# Patient Record
Sex: FEMALE | Race: WHITE | Employment: FULL TIME | URBAN - NONMETROPOLITAN AREA
[De-identification: names, ages, dates, MRNs, and addresses within clinical notes are randomized per-mention and may not be internally consistent; named-entity substitution may affect disease eponyms.]

---

## 2017-03-06 ENCOUNTER — OFFICE VISIT (OUTPATIENT)
Age: 52
End: 2017-03-06

## 2017-03-06 VITALS
BODY MASS INDEX: 25.76 KG/M2 | SYSTOLIC BLOOD PRESSURE: 122 MMHG | WEIGHT: 170 LBS | TEMPERATURE: 98.7 F | RESPIRATION RATE: 16 BRPM | OXYGEN SATURATION: 97 % | DIASTOLIC BLOOD PRESSURE: 74 MMHG | HEART RATE: 80 BPM | HEIGHT: 68 IN

## 2017-03-06 DIAGNOSIS — E03.9 HYPOTHYROIDISM, UNSPECIFIED TYPE: ICD-10-CM

## 2017-03-06 DIAGNOSIS — Z85.3 HISTORY OF INVASIVE BREAST CANCER: Primary | ICD-10-CM

## 2017-03-06 PROCEDURE — 99214 OFFICE O/P EST MOD 30 MIN: CPT | Performed by: SURGERY

## 2017-03-06 RX ORDER — LISINOPRIL 10 MG/1
10 TABLET ORAL DAILY
COMMUNITY

## 2017-03-06 ASSESSMENT — ENCOUNTER SYMPTOMS
COLOR CHANGE: 0
BLOOD IN STOOL: 0
SHORTNESS OF BREATH: 0
VOMITING: 0
NAUSEA: 0
TROUBLE SWALLOWING: 0
COUGH: 0
WHEEZING: 0
ABDOMINAL PAIN: 0
VOICE CHANGE: 0
SORE THROAT: 0

## 2018-12-12 ENCOUNTER — HOSPITAL ENCOUNTER (OUTPATIENT)
Dept: MRI IMAGING | Age: 53
Discharge: HOME | End: 2018-12-12
Payer: COMMERCIAL

## 2018-12-12 DIAGNOSIS — C50.411 MALIGNANT NEOPLASM OF UPPER-OUTER QUADRANT OF RIGHT FEMALE BREAST, UNSPECIFIED ESTROGEN RECEPTOR STATUS (HCC): ICD-10-CM

## 2018-12-12 LAB — POC CREATININE WHOLE BLOOD: 0.7 MG/DL (ref 0.5–1.2)

## 2018-12-12 PROCEDURE — A9581 GADOXETATE DISODIUM INJ: HCPCS | Performed by: INTERNAL MEDICINE

## 2018-12-12 PROCEDURE — 82565 ASSAY OF CREATININE: CPT

## 2018-12-12 PROCEDURE — 6360000004 HC RX CONTRAST MEDICATION: Performed by: INTERNAL MEDICINE

## 2018-12-12 PROCEDURE — 74183 MRI ABD W/O CNTR FLWD CNTR: CPT

## 2018-12-12 RX ADMIN — GADOXETATE DISODIUM 10 ML: 181.43 INJECTION, SOLUTION INTRAVENOUS at 12:52

## 2019-03-25 ENCOUNTER — NURSE ONLY (OUTPATIENT)
Dept: LAB | Age: 54
End: 2019-03-25

## 2019-03-25 LAB
ALBUMIN SERPL-MCNC: 4 G/DL (ref 3.5–5.1)
ALP BLD-CCNC: 82 U/L (ref 38–126)
ALT SERPL-CCNC: 70 U/L (ref 11–66)
ANION GAP SERPL CALCULATED.3IONS-SCNC: 11 MEQ/L (ref 8–16)
AST SERPL-CCNC: 40 U/L (ref 5–40)
BASOPHILS # BLD: 0.4 %
BASOPHILS ABSOLUTE: 0 THOU/MM3 (ref 0–0.1)
BILIRUB SERPL-MCNC: 0.3 MG/DL (ref 0.3–1.2)
BUN BLDV-MCNC: 17 MG/DL (ref 7–22)
CALCIUM SERPL-MCNC: 9.2 MG/DL (ref 8.5–10.5)
CHLORIDE BLD-SCNC: 102 MEQ/L (ref 98–111)
CO2: 26 MEQ/L (ref 23–33)
CREAT SERPL-MCNC: 0.7 MG/DL (ref 0.4–1.2)
EOSINOPHIL # BLD: 1.2 %
EOSINOPHILS ABSOLUTE: 0.1 THOU/MM3 (ref 0–0.4)
ERYTHROCYTE [DISTWIDTH] IN BLOOD BY AUTOMATED COUNT: 12.9 % (ref 11.5–14.5)
ERYTHROCYTE [DISTWIDTH] IN BLOOD BY AUTOMATED COUNT: 42.1 FL (ref 35–45)
ESTRADIOL LEVEL: 467.4 PG/ML
FOLLICLE STIMULATING HORMONE: 20.8 MIU/ML (ref 16–160)
GFR SERPL CREATININE-BSD FRML MDRD: 87 ML/MIN/1.73M2
GLUCOSE BLD-MCNC: 95 MG/DL (ref 70–108)
HCT VFR BLD CALC: 38.3 % (ref 37–47)
HEMOGLOBIN: 12.4 GM/DL (ref 12–16)
IMMATURE GRANS (ABS): 0.02 THOU/MM3 (ref 0–0.07)
IMMATURE GRANULOCYTES: 0.3 %
LUTEINIZING HORMONE: 23.6 MIU/ML (ref 3.3–70.6)
LYMPHOCYTES # BLD: 28.4 %
LYMPHOCYTES ABSOLUTE: 2 THOU/MM3 (ref 1–4.8)
MCH RBC QN AUTO: 28.9 PG (ref 26–33)
MCHC RBC AUTO-ENTMCNC: 32.4 GM/DL (ref 32.2–35.5)
MCV RBC AUTO: 89.3 FL (ref 81–99)
MONOCYTES # BLD: 8.1 %
MONOCYTES ABSOLUTE: 0.6 THOU/MM3 (ref 0.4–1.3)
NUCLEATED RED BLOOD CELLS: 0 /100 WBC
PLATELET # BLD: 262 THOU/MM3 (ref 130–400)
PMV BLD AUTO: 10.6 FL (ref 9.4–12.4)
POTASSIUM SERPL-SCNC: 3.8 MEQ/L (ref 3.5–5.2)
RBC # BLD: 4.29 MILL/MM3 (ref 4.2–5.4)
SEG NEUTROPHILS: 61.6 %
SEGMENTED NEUTROPHILS ABSOLUTE COUNT: 4.3 THOU/MM3 (ref 1.8–7.7)
SODIUM BLD-SCNC: 139 MEQ/L (ref 135–145)
TOTAL PROTEIN: 6.9 G/DL (ref 6.1–8)
WBC # BLD: 6.9 THOU/MM3 (ref 4.8–10.8)

## 2019-03-27 LAB — CA 27-29: 10 U/ML (ref 0–38)

## 2019-04-25 ENCOUNTER — NURSE ONLY (OUTPATIENT)
Dept: LAB | Age: 54
End: 2019-04-25

## 2019-04-25 LAB — TSH SERPL DL<=0.05 MIU/L-ACNC: 0.87 UIU/ML (ref 0.4–4.2)

## 2019-04-26 LAB — THYROXINE (T4): 8.9 UG/DL (ref 4.5–12)

## 2019-09-30 ENCOUNTER — NURSE ONLY (OUTPATIENT)
Dept: LAB | Age: 54
End: 2019-09-30

## 2019-09-30 LAB
ALBUMIN SERPL-MCNC: 4.4 G/DL (ref 3.5–5.1)
ALP BLD-CCNC: 76 U/L (ref 38–126)
ALT SERPL-CCNC: 90 U/L (ref 11–66)
ANION GAP SERPL CALCULATED.3IONS-SCNC: 13 MEQ/L (ref 8–16)
AST SERPL-CCNC: 59 U/L (ref 5–40)
BASOPHILS # BLD: 1 %
BASOPHILS ABSOLUTE: 0.1 THOU/MM3 (ref 0–0.1)
BILIRUB SERPL-MCNC: 0.5 MG/DL (ref 0.3–1.2)
BUN BLDV-MCNC: 18 MG/DL (ref 7–22)
CALCIUM SERPL-MCNC: 9.5 MG/DL (ref 8.5–10.5)
CHLORIDE BLD-SCNC: 100 MEQ/L (ref 98–111)
CO2: 26 MEQ/L (ref 23–33)
CREAT SERPL-MCNC: 0.7 MG/DL (ref 0.4–1.2)
EOSINOPHIL # BLD: 1.2 %
EOSINOPHILS ABSOLUTE: 0.1 THOU/MM3 (ref 0–0.4)
ERYTHROCYTE [DISTWIDTH] IN BLOOD BY AUTOMATED COUNT: 13.2 % (ref 11.5–14.5)
ERYTHROCYTE [DISTWIDTH] IN BLOOD BY AUTOMATED COUNT: 44.5 FL (ref 35–45)
GFR SERPL CREATININE-BSD FRML MDRD: 87 ML/MIN/1.73M2
GLUCOSE BLD-MCNC: 108 MG/DL (ref 70–108)
HCT VFR BLD CALC: 40.5 % (ref 37–47)
HEMOGLOBIN: 12.7 GM/DL (ref 12–16)
IMMATURE GRANS (ABS): 0.02 THOU/MM3 (ref 0–0.07)
IMMATURE GRANULOCYTES: 0.3 %
LYMPHOCYTES # BLD: 30.7 %
LYMPHOCYTES ABSOLUTE: 1.8 THOU/MM3 (ref 1–4.8)
MCH RBC QN AUTO: 28.8 PG (ref 26–33)
MCHC RBC AUTO-ENTMCNC: 31.4 GM/DL (ref 32.2–35.5)
MCV RBC AUTO: 91.8 FL (ref 81–99)
MONOCYTES # BLD: 9.6 %
MONOCYTES ABSOLUTE: 0.6 THOU/MM3 (ref 0.4–1.3)
NUCLEATED RED BLOOD CELLS: 0 /100 WBC
PLATELET # BLD: 255 THOU/MM3 (ref 130–400)
PMV BLD AUTO: 10.8 FL (ref 9.4–12.4)
POTASSIUM SERPL-SCNC: 4 MEQ/L (ref 3.5–5.2)
RBC # BLD: 4.41 MILL/MM3 (ref 4.2–5.4)
SEG NEUTROPHILS: 57.2 %
SEGMENTED NEUTROPHILS ABSOLUTE COUNT: 3.3 THOU/MM3 (ref 1.8–7.7)
SODIUM BLD-SCNC: 139 MEQ/L (ref 135–145)
TOTAL PROTEIN: 7.2 G/DL (ref 6.1–8)
WBC # BLD: 5.8 THOU/MM3 (ref 4.8–10.8)

## 2019-10-02 LAB — CA 27-29: 9 U/ML (ref 0–38)

## 2020-04-23 ENCOUNTER — HOSPITAL ENCOUNTER (OUTPATIENT)
Age: 55
Discharge: HOME | End: 2020-04-23
Payer: COMMERCIAL

## 2020-04-23 LAB
CORTISOL COLLECTION INFO: NORMAL
CORTISOL: 27.23 UG/DL
THYROXINE (T4): 9.8 UG/DL (ref 4.5–12)
TSH SERPL DL<=0.05 MIU/L-ACNC: 2.01 UIU/ML (ref 0.4–4.2)

## 2020-04-23 PROCEDURE — 84436 ASSAY OF TOTAL THYROXINE: CPT

## 2020-04-23 PROCEDURE — 82533 TOTAL CORTISOL: CPT

## 2020-04-23 PROCEDURE — 36415 COLL VENOUS BLD VENIPUNCTURE: CPT

## 2020-04-23 PROCEDURE — 84443 ASSAY THYROID STIM HORMONE: CPT

## 2020-05-01 ENCOUNTER — HOSPITAL ENCOUNTER (OUTPATIENT)
Age: 55
Discharge: HOME | End: 2020-05-01
Payer: COMMERCIAL

## 2020-05-01 LAB
CORTISOL COLLECTION INFO: NORMAL
CORTISOL: 1.18 UG/DL

## 2020-05-01 PROCEDURE — 36415 COLL VENOUS BLD VENIPUNCTURE: CPT

## 2020-05-01 PROCEDURE — 82533 TOTAL CORTISOL: CPT

## 2020-12-09 ENCOUNTER — HOSPITAL ENCOUNTER (OUTPATIENT)
Dept: MRI IMAGING | Age: 55
Discharge: HOME | End: 2020-12-09
Payer: COMMERCIAL

## 2020-12-09 LAB — POC CREATININE WHOLE BLOOD: 1.1 MG/DL (ref 0.5–1.2)

## 2020-12-09 PROCEDURE — 77049 MRI BREAST C-+ W/CAD BI: CPT

## 2020-12-09 PROCEDURE — 82565 ASSAY OF CREATININE: CPT

## 2020-12-09 PROCEDURE — A9579 GAD-BASE MR CONTRAST NOS,1ML: HCPCS | Performed by: INTERNAL MEDICINE

## 2020-12-09 PROCEDURE — 6360000004 HC RX CONTRAST MEDICATION: Performed by: INTERNAL MEDICINE

## 2020-12-09 RX ADMIN — GADOTERIDOL 15 ML: 279.3 INJECTION, SOLUTION INTRAVENOUS at 15:43

## 2020-12-11 ENCOUNTER — HOSPITAL ENCOUNTER (OUTPATIENT)
Dept: WOMENS IMAGING | Age: 55
Discharge: HOME | End: 2020-12-11
Payer: COMMERCIAL

## 2020-12-11 PROCEDURE — 76642 ULTRASOUND BREAST LIMITED: CPT

## 2021-03-15 ENCOUNTER — OFFICE VISIT (OUTPATIENT)
Dept: SURGERY | Age: 56
End: 2021-03-15
Payer: COMMERCIAL

## 2021-03-15 VITALS
SYSTOLIC BLOOD PRESSURE: 132 MMHG | TEMPERATURE: 97.1 F | DIASTOLIC BLOOD PRESSURE: 80 MMHG | RESPIRATION RATE: 15 BRPM | OXYGEN SATURATION: 97 % | HEART RATE: 67 BPM | HEIGHT: 68 IN | BODY MASS INDEX: 27.74 KG/M2 | WEIGHT: 183 LBS

## 2021-03-15 DIAGNOSIS — C50.919 MALIGNANT NEOPLASM OF FEMALE BREAST, UNSPECIFIED ESTROGEN RECEPTOR STATUS, UNSPECIFIED LATERALITY, UNSPECIFIED SITE OF BREAST (HCC): ICD-10-CM

## 2021-03-15 DIAGNOSIS — N92.1 MENORRHAGIA WITH IRREGULAR CYCLE: ICD-10-CM

## 2021-03-15 DIAGNOSIS — K43.0 INCARCERATED INCISIONAL HERNIA: Primary | ICD-10-CM

## 2021-03-15 PROCEDURE — 99243 OFF/OP CNSLTJ NEW/EST LOW 30: CPT | Performed by: SURGERY

## 2021-03-15 RX ORDER — MULTIVIT WITH MINERALS/LUTEIN
250 TABLET ORAL DAILY
COMMUNITY

## 2021-03-15 NOTE — LETTER
[address] [facility] Surgery  [address]  Phone: [phone]  Fax: [phone]    Cece Dennison MD        March 16, 2021    Abiola Hernandez MD  [address] [address]    Patient: Charlie Mcdaniel   MR Number: [id number]   YOB: 1965   Date of Visit: 3/15/2021     Dear Abiola Hernandez,    I recently saw your patient, Charlie Mcdaniel for the evaluation of an incarcerated port site hernia. Below are the relevant portions of my assessment and plan of care. 1. Incarcerated incisional hernia    2. Menorrhagia with irregular cycle    3. Malignant neoplasm of female breast, unspecified estrogen receptor status, unspecified laterality, unspecified site of breast ([facility] [address]        1. Schedule patient for open repair of incarcerated incisional hernia at time of her robotic assisted laparoscopic hysterectomy  2. General anesthesia  3. Preoperative testing per anesthesia guidelines  4. Techniques and risks of repair were discussed with Rebel Mason. As a small hernia would recommend a primary repair with suture. I do not feel she needs mesh she is reluctant for mesh placement. Minimally invasive procedure would only add port sites and I do not think that is indicated at this point. Risks were discussed and include recurrence of hernia as well as bleeding and wound infections. All questions answered. Patient wishes to proceed at the time of her already scheduled hysterectomy. If you have questions, please do not hesitate to call me. I look forward to following Rebel Mason along with you.     Sincerely,          Cece Dennison MD

## 2021-03-15 NOTE — PROGRESS NOTES
Yolanda Newton MD   General Surgery  New Patient Evaluation in Office  Pt Name: Le Wiley  Date of Birth 1965   Today's Date: 3/15/2021  Medical Record Number: [id number]  Referring Provider: Zahra Stevens  Primary Care Provider: Marlene Vargas MD  Chief Complaint:  Chief Complaint   Patient presents with   [facility]     established pt--ref by Dr. Zahra Stevens for umbilical hernia-combination surgery 4/8       ASSESSMENT      1. Incarcerated incisional hernia    2. Menorrhagia with irregular cycle    3. Malignant neoplasm of female breast, unspecified estrogen receptor status, unspecified laterality, unspecified site of breast ([facility] [address]         PLANS      1. Schedule patient for open repair of incarcerated incisional hernia at time of her robotic assisted laparoscopic hysterectomy  2. General anesthesia  3. Preoperative testing per anesthesia guidelines  4. Techniques and risks of repair were discussed with Ethan Chapman. As a small hernia would recommend a primary repair with suture. I do not feel she needs mesh she is reluctant for mesh placement. Minimally invasive procedure would only add port sites and I do not think that is indicated at this point. Risks were discussed and include recurrence of hernia as well as bleeding and wound infections. All questions answered. Patient wishes to proceed at the time of her already scheduled hysterectomy. Haylee Gusman is a 54y.o. year old female who is presenting today in the office for evaluation of an incarcerated incisional port site hernia. She had a remote laparoscopic cholecystectomy and has an incarcerated hernia at the camera port site in the mid abdomen. She has a recent history of menorrhagia and is undergoing a robotic assisted laparoscopic hysterectomy. She has a personal history of left breast cancer and underwent bilateral mastectomy with reconstruction in 2015. She continues on tamoxifen therapy.   She required Take 10 mg by mouth daily        No current facility-administered medications for this visit.       Allergies     Allergies   Allergen Reactions    Augmentin [Amoxicillin-Pot Clavulanate] Hives, Diarrhea and Swelling    Hydantoins     Other      Sodium pentathol  Makes me a \"crazy person\"  Steri strips caused redness and rash  bandaids - rash    Pcn [Penicillins]     Biaxin [Clarithromycin] Rash    Norco [Hydrocodone-Acetaminophen] Other (See Comments)     Diaphoretic, flushed    Vancomycin Itching and Rash       Family History  Family History   Problem Relation Age of Onset    Breast Cancer Maternal Aunt         age 42's at onset    Heart Disease Mother     Heart Disease Father     Asthma Maternal Grandmother     Heart Disease Paternal Grandfather     Heart Attack Maternal Grandfather     Ovarian Cancer Neg Hx     Diabetes Neg Hx     Kidney Disease Neg Hx     Stroke Neg Hx        SocialHistory  Social History     Socioeconomic History    Marital status:      Spouse name: Not on file    Number of children: Not on file    Years of education: Not on file    Highest education level: Not on file   Occupational History    Not on file   Social Needs    Financial resource strain: Not on file    Food insecurity     Worry: Not on file     Inability: Not on file    Transportation needs     Medical: Not on file     Non-medical: Not on file   Tobacco Use    Smoking status: Never Smoker    Smokeless tobacco: Never Used   Substance and Sexual Activity    Alcohol use: Yes     Comment: social    Drug use: No    Sexual activity: Not on file   Lifestyle    Physical activity     Days per week: Not on file     Minutes per session: Not on file    Stress: Not on file   Relationships    Social connections     Talks on phone: Not on file     Gets together: Not on file     Attends Quaker service: Not on file     Active member of club or organization: Not on file     Attends meetings of clubs or organizations: Not on file     Relationship status: Not on file    Intimate partner violence     Fear of current or ex partner: Not on file     Emotionally abused: Not on file     Physically abused: Not on file     Forced sexual activity: Not on file   Other Topics Concern    Not on file   Social History Narrative    Not on file           Review of Systems  Review of Systems   Constitutional: Negative for chills, fatigue, fever and unexpected weight change. HENT: Negative for sore throat, trouble swallowing and voice change. Eyes: Negative for photophobia and visual disturbance. Respiratory: Negative for cough, shortness of breath and wheezing. Cardiovascular: Negative for chest pain and palpitations. Gastrointestinal: Positive for abdominal pain. Negative for blood in stool, constipation, nausea and vomiting. Intermittent pain at site of hernia   Endocrine: Negative for cold intolerance, heat intolerance and polydipsia. Genitourinary: Positive for vaginal bleeding. Negative for difficulty urinating, dysuria, flank pain and hematuria. Musculoskeletal: Negative for gait problem, joint swelling and myalgias. Skin: Negative for color change and rash. Allergic/Immunologic: Negative for immunocompromised state. Neurological: Negative for tremors, seizures, speech difficulty and headaches. Hematological: Negative for adenopathy. Does not bruise/bleed easily. Psychiatric/Behavioral: Negative for behavioral problems and confusion. The patient is not nervous/anxious. OBJECTIVE     /80 (Site: Right Upper Arm, Position: Sitting, Cuff Size: Medium Adult)   Pulse 67   Temp 97.1 °F (36.2 °C) (Tympanic)   Resp 15   Ht 5' 8\" (1.727 m)   Wt 183 lb (83 kg)   SpO2 97%   BMI 27.83 kg/m²      Physical Exam  Vitals signs reviewed. Constitutional:       Appearance: Normal appearance. She is well-developed. She is not ill-appearing or diaphoretic.    HENT:      Head: Normocephalic and atraumatic. Nose: No congestion. Eyes:      General: No scleral icterus. Extraocular Movements: Extraocular movements intact. Pupils: Pupils are equal, round, and reactive to light. Neck:      Musculoskeletal: Neck supple. Thyroid: No thyromegaly. Vascular: No JVD. Trachea: No tracheal deviation. Cardiovascular:      Rate and Rhythm: Normal rate and regular rhythm. Heart sounds: Normal heart sounds. No murmur. Pulmonary:      Effort: Pulmonary effort is normal. No respiratory distress. Breath sounds: Normal breath sounds. No wheezing. Abdominal:      General: There is no distension. Palpations: Abdomen is soft. Tenderness: There is no abdominal tenderness. There is no guarding or rebound. Hernia: A hernia is present. Musculoskeletal: Normal range of motion. Lymphadenopathy:      Cervical: No cervical adenopathy. Skin:     General: Skin is warm and dry. Findings: No rash. Neurological:      General: No focal deficit present. Mental Status: She is alert and oriented to person, place, and time. Cranial Nerves: No cranial nerve deficit. Psychiatric:         Mood and Affect: Mood normal.         Thought Content:  Thought content normal.         Lab Results   Component Value Date    WBC 7.2 03/31/2020    HGB 13.0 03/31/2020    HCT 40.8 03/31/2020     03/31/2020    ALT 61 03/31/2020    AST 39 03/31/2020     03/31/2020    K 4.0 03/31/2020     03/31/2020    CREATININE 0.7 03/31/2020    BUN 14 03/31/2020    CO2 23 03/31/2020    TSH 2.010 04/23/2020            #[id number] - MRI BREAST BILATERAL W WO CONTRAST   BREAST MRI OF THE LEFT BREAST : 12/9/2020   CLINICAL: Malignant neoplasm Breast, s/p mastectomy with silicone    implants.         Comparison is made to exams dated:  2/2/2015 breast MRI,    1/22/2015 mammogram, and 1/19/2015 mammogram - [facility].         TECHNIQUE:  Routine MRI of the bilateral breasts in a dedicated    breast coil.  Axial T1 gradient imaging of the bilateral breast    was performed without and with IV contrast.  The post contrast    imaging was performed dynamically.  Sagittal T1 gradient imaging    was also performed through both breasts.  Additional STIR imaging    was performed through the breasts in the axial plane and the    thorax in the coronal plane.  The breast MRI examination was    evaluated on a Singularu work station with 3D reconstruction and    time activity curves.             FINDINGS:   There are bilateral mastectomies with bilateral retropectoral    implant recomstructions. The bilateral breast implants are    intact.       There is a 0.3 x 0.6 x 0.6 cm circumscribed enhancing oval nodule    lower outer quadrant right breast (image 62) with predominently    washout delayed enhancement kinetics. This could possibly    represent a lymph node. A second look ultrasound is recommended.       There is a 0.5 cm circumscribed enhancing round nodule lower    outer quadrant of the right breast middle depth (image 43) which    abutts the lateral margin of the implant. A second look    ultrasound is recommended.       There are no other pathologically enlarged lymph nodes.       There is thyromegaly.       There is no musculoskeletal abnormality.       The imaging of the upper abdomen is unremarkable.               IMPRESSION: INCOMPLETE: NEEDS ADDITIONAL IMAGING EVALUATION    1. There are bilateral mastectomies with bilateral retropectoral    implant recomstructions. The bilateral breast implants are    intact.       2. There is a 0.3 x 0.6 x 0.6 cm circumscribed enhancing oval    nodule lower outer quadrant right breast (image 62) with    predominently washout delayed enhancement kinetics. This could    possibly represent a lymph node. A second look ultrasound is    recommended.       3.  There is a 0.5 cm circumscribed enhancing round nodule lower    outer quadrant of the right breast middle depth (image 43) which    abutts the lateral margin of the implant. A second look    ultrasound is recommended.       4. The patient has been or will be contacted.                     Luis M Jones M.D.     pgk/:12/9/2020 17:35:40     copy to: [address] Davidson. Jose Alfredo Jensen., [facility] [place], [place]: [phone], fax: [phone]       Imaging Technologist: Rima LINK(R)(MR), [address]69   letter sent: Additional Tests Needed     MRI BI-RADS: Category 0: Incomplete: Needs Additional Imaging    Evaluation   [id number]                    Narrative       IMPRESSION: Ultrasound BI-RADS: Category 3: Probably Benign   1. There is a 0.8 cm x 0.4 cm x 0.7 cm oval lymph node with a    circumscribed margin in the right breast lower outer aspect    middle depth 7 cm from the nipple which is probably benign.         2. There is no sonographic correlate for the 0.5 cm nodular focus    of signal lower outer quadrant right breast middle depth    abutting the lateral aspect of the implant. This could represent    magnetic susceptibility artifact from the previous surgery.       3. A follow-up right ultrasound in 6 months is recommended to    demonstrate stability.         4. The patient has been or will be contacted.                 #[id number] - US BREAST LIMITED RIGHT   ULTRASOUND OF RIGHT BREAST AND RIGHT AXILLA: 12/11/2020   CLINICAL: Ultrasound suggested after abnormal MRI of the breasts.            Comparison is made to exams dated:  12/9/2020 breast MRI and    2/2/2015 breast MRI - [facility].     Ultrasound of the right breast and axilla was performed on the    areas of interest.  Gray scale images of the real-time    examination were reviewed.     There is a 0.8 cm x 0.4 cm x 0.7 cm oval lymph node with a    circumscribed margin in the right breast lower outer aspect    middle depth 7 cm from the nipple.  This oval lymph node is    hypoechoic with fatty hilum. There is no associated cortical    thickening. This correlates with breast MRI findings.     There is no sonographic correlate to account for the 0.5 cm    nodular focus of signal lower outer quadrant right breast middle    depth abutting the lateral aspect of the implant. This could    represent magnetic susceptibility artifact from the previous    surgery. No abnormalities were seen sonographically in the right axilla.             Alex Kimbrough M.D.     pgk/:12/11/2020 15:53:45     copy to: Stephanie Junior.  Kulwant Walker., [facility] [place], [place]: [phone], fax: [phone]   copy to: Dr Karina Carrillo MD, [facility] ph:    [phone], fax: [phone]       Imaging Technologist: Carolina LINK(JULIO C)(M), [address]   letter sent: 6 Month Followup Recommended        [id number]

## 2021-03-16 ASSESSMENT — ENCOUNTER SYMPTOMS
TROUBLE SWALLOWING: 0
SHORTNESS OF BREATH: 0
NAUSEA: 0
VOMITING: 0
CONSTIPATION: 0
COUGH: 0
PHOTOPHOBIA: 0
COLOR CHANGE: 0
WHEEZING: 0
ABDOMINAL PAIN: 1
BLOOD IN STOOL: 0
SORE THROAT: 0
VOICE CHANGE: 0

## 2021-03-31 NOTE — PROGRESS NOTES
NPO after midnight except sip of water with heart/BP meds  Follow all instructions given by surgeon including medications to hold  Bring insurance card and photo ID  Shower the night before or morning of procedure with liquid antibacterial soap  Wear comfortable clothing  Do not bring jewelry or valuables  Bring list of medications with dosage and how often taken if not reviewed   needed at discharge at least 25years old  Call PAT at [phone] for questions    Instructed to call surgery center at [phone] upon arrival to speak with  before entering building. Covid screen due  at [facility] 6 to 7 days before procedure.  Pt plans to have completed on 4/1    Covid screening questionnaire complete and negative for symptoms or exposure see chart for documentation

## 2021-04-01 ENCOUNTER — HOSPITAL ENCOUNTER (OUTPATIENT)
Age: 56
Discharge: HOME | End: 2021-04-01
Payer: COMMERCIAL

## 2021-04-01 LAB
EKG ATRIAL RATE: 74 BPM
EKG P AXIS: 71 DEGREES
EKG P-R INTERVAL: 146 MS
EKG Q-T INTERVAL: 402 MS
EKG QRS DURATION: 86 MS
EKG QTC CALCULATION (BAZETT): 446 MS
EKG R AXIS: 75 DEGREES
EKG T AXIS: 73 DEGREES
EKG VENTRICULAR RATE: 74 BPM

## 2021-04-01 PROCEDURE — 93010 ELECTROCARDIOGRAM REPORT: CPT | Performed by: INTERNAL MEDICINE

## 2021-04-01 PROCEDURE — 93005 ELECTROCARDIOGRAM TRACING: CPT | Performed by: OBSTETRICS & GYNECOLOGY

## 2021-04-01 PROCEDURE — U0003 INFECTIOUS AGENT DETECTION BY NUCLEIC ACID (DNA OR RNA); SEVERE ACUTE RESPIRATORY SYNDROME CORONAVIRUS 2 (SARS-COV-2) (CORONAVIRUS DISEASE [COVID-19]), AMPLIFIED PROBE TECHNIQUE, MAKING USE OF HIGH THROUGHPUT TECHNOLOGIES AS DESCRIBED BY CMS-2020-01-R: HCPCS

## 2021-04-05 LAB
SARS-COV-2: NOT DETECTED
SOURCE: NORMAL

## 2021-04-07 ENCOUNTER — ANESTHESIA EVENT (OUTPATIENT)
Dept: OPERATING ROOM | Age: 56
End: 2021-04-07
Payer: COMMERCIAL

## 2021-04-07 NOTE — H&P
total laparoscopic hysterectomy with  bilateral salpingo-oophorectomy with robotic umbilical hernia repair by  Dr. Rosana Lazo.         Ty Nelson M.D.    D: 04/07/2021 8:39:09       T: 04/07/2021 8:47:10     [email or web address]  Job#: [id number]     Doc#: [id number]    CC:

## 2021-04-08 ENCOUNTER — HOSPITAL ENCOUNTER (OUTPATIENT)
Age: 56
Setting detail: OUTPATIENT SURGERY
Discharge: HOME | End: 2021-04-08
Attending: OBSTETRICS & GYNECOLOGY | Admitting: OBSTETRICS & GYNECOLOGY
Payer: COMMERCIAL

## 2021-04-08 ENCOUNTER — ANESTHESIA (OUTPATIENT)
Dept: OPERATING ROOM | Age: 56
End: 2021-04-08
Payer: COMMERCIAL

## 2021-04-08 VITALS
RESPIRATION RATE: 8 BRPM | OXYGEN SATURATION: 100 % | DIASTOLIC BLOOD PRESSURE: 66 MMHG | SYSTOLIC BLOOD PRESSURE: 119 MMHG | TEMPERATURE: 97.8 F

## 2021-04-08 VITALS
WEIGHT: 180.4 LBS | TEMPERATURE: 98.8 F | SYSTOLIC BLOOD PRESSURE: 153 MMHG | OXYGEN SATURATION: 95 % | RESPIRATION RATE: 16 BRPM | HEIGHT: 68 IN | DIASTOLIC BLOOD PRESSURE: 83 MMHG | BODY MASS INDEX: 27.34 KG/M2 | HEART RATE: 98 BPM

## 2021-04-08 DIAGNOSIS — Z90.710 S/P LAPAROSCOPIC HYSTERECTOMY: Primary | ICD-10-CM

## 2021-04-08 PROBLEM — K43.0 INCARCERATED INCISIONAL HERNIA: Status: ACTIVE | Noted: 2021-04-08

## 2021-04-08 LAB — PREGNANCY, URINE: NEGATIVE

## 2021-04-08 PROCEDURE — 2580000003 HC RX 258: Performed by: OBSTETRICS & GYNECOLOGY

## 2021-04-08 PROCEDURE — 3600000019 HC SURGERY ROBOT ADDTL 15MIN: Performed by: OBSTETRICS & GYNECOLOGY

## 2021-04-08 PROCEDURE — 3600000009 HC SURGERY ROBOT BASE: Performed by: OBSTETRICS & GYNECOLOGY

## 2021-04-08 PROCEDURE — 6360000002 HC RX W HCPCS: Performed by: OBSTETRICS & GYNECOLOGY

## 2021-04-08 PROCEDURE — 2720000010 HC SURG SUPPLY STERILE: Performed by: OBSTETRICS & GYNECOLOGY

## 2021-04-08 PROCEDURE — 49568 PR IMPLANT MESH HERNIA REPAIR/DEBRIDEMENT CLOSURE: CPT | Performed by: SURGERY

## 2021-04-08 PROCEDURE — 2500000003 HC RX 250 WO HCPCS: Performed by: NURSE ANESTHETIST, CERTIFIED REGISTERED

## 2021-04-08 PROCEDURE — S2900 ROBOTIC SURGICAL SYSTEM: HCPCS | Performed by: OBSTETRICS & GYNECOLOGY

## 2021-04-08 PROCEDURE — 7100000011 HC PHASE II RECOVERY - ADDTL 15 MIN: Performed by: OBSTETRICS & GYNECOLOGY

## 2021-04-08 PROCEDURE — 3700000001 HC ADD 15 MINUTES (ANESTHESIA): Performed by: OBSTETRICS & GYNECOLOGY

## 2021-04-08 PROCEDURE — 81025 URINE PREGNANCY TEST: CPT

## 2021-04-08 PROCEDURE — 6370000000 HC RX 637 (ALT 250 FOR IP)

## 2021-04-08 PROCEDURE — 7100000000 HC PACU RECOVERY - FIRST 15 MIN: Performed by: OBSTETRICS & GYNECOLOGY

## 2021-04-08 PROCEDURE — 6370000000 HC RX 637 (ALT 250 FOR IP): Performed by: OBSTETRICS & GYNECOLOGY

## 2021-04-08 PROCEDURE — 7100000001 HC PACU RECOVERY - ADDTL 15 MIN: Performed by: OBSTETRICS & GYNECOLOGY

## 2021-04-08 PROCEDURE — 7100000010 HC PHASE II RECOVERY - FIRST 15 MIN: Performed by: OBSTETRICS & GYNECOLOGY

## 2021-04-08 PROCEDURE — 6360000002 HC RX W HCPCS: Performed by: NURSE ANESTHETIST, CERTIFIED REGISTERED

## 2021-04-08 PROCEDURE — 3700000000 HC ANESTHESIA ATTENDED CARE: Performed by: OBSTETRICS & GYNECOLOGY

## 2021-04-08 PROCEDURE — 2500000003 HC RX 250 WO HCPCS: Performed by: OBSTETRICS & GYNECOLOGY

## 2021-04-08 PROCEDURE — 2709999900 HC NON-CHARGEABLE SUPPLY: Performed by: OBSTETRICS & GYNECOLOGY

## 2021-04-08 PROCEDURE — 49561 PR REPAIR INCISIONAL HERNIA,STRANG: CPT | Performed by: SURGERY

## 2021-04-08 PROCEDURE — 88307 TISSUE EXAM BY PATHOLOGIST: CPT

## 2021-04-08 PROCEDURE — C1781 MESH (IMPLANTABLE): HCPCS | Performed by: OBSTETRICS & GYNECOLOGY

## 2021-04-08 DEVICE — IMPLANTABLE DEVICE: Type: IMPLANTABLE DEVICE | Site: UMBILICAL | Status: FUNCTIONAL

## 2021-04-08 RX ORDER — KETOROLAC TROMETHAMINE 10 MG/1
10 TABLET, FILM COATED ORAL EVERY 6 HOURS PRN
Qty: 20 TABLET | Refills: 1 | Status: SHIPPED | OUTPATIENT
Start: 2021-04-08 | End: 2021-04-22 | Stop reason: ALTCHOICE

## 2021-04-08 RX ORDER — ONDANSETRON 2 MG/ML
INJECTION INTRAMUSCULAR; INTRAVENOUS PRN
Status: DISCONTINUED | OUTPATIENT
Start: 2021-04-08 | End: 2021-04-08 | Stop reason: SDUPTHER

## 2021-04-08 RX ORDER — SODIUM CHLORIDE 0.9 % (FLUSH) 0.9 %
5-40 SYRINGE (ML) INJECTION EVERY 12 HOURS SCHEDULED
Status: DISCONTINUED | OUTPATIENT
Start: 2021-04-08 | End: 2021-04-08 | Stop reason: HOSPADM

## 2021-04-08 RX ORDER — ONDANSETRON 4 MG/1
4 TABLET, ORALLY DISINTEGRATING ORAL ONCE
Status: COMPLETED | OUTPATIENT
Start: 2021-04-08 | End: 2021-04-08

## 2021-04-08 RX ORDER — OXYCODONE HYDROCHLORIDE AND ACETAMINOPHEN 5; 325 MG/1; MG/1
1 TABLET ORAL EVERY 4 HOURS PRN
Status: DISCONTINUED | OUTPATIENT
Start: 2021-04-08 | End: 2021-04-08 | Stop reason: HOSPADM

## 2021-04-08 RX ORDER — KETOROLAC TROMETHAMINE 30 MG/ML
30 INJECTION, SOLUTION INTRAMUSCULAR; INTRAVENOUS EVERY 6 HOURS
Status: DISCONTINUED | OUTPATIENT
Start: 2021-04-08 | End: 2021-04-08 | Stop reason: HOSPADM

## 2021-04-08 RX ORDER — HYDRALAZINE HYDROCHLORIDE 20 MG/ML
5 INJECTION INTRAMUSCULAR; INTRAVENOUS EVERY 10 MIN PRN
Status: DISCONTINUED | OUTPATIENT
Start: 2021-04-08 | End: 2021-04-08 | Stop reason: HOSPADM

## 2021-04-08 RX ORDER — ONDANSETRON 2 MG/ML
4 INJECTION INTRAMUSCULAR; INTRAVENOUS
Status: DISCONTINUED | OUTPATIENT
Start: 2021-04-08 | End: 2021-04-08 | Stop reason: HOSPADM

## 2021-04-08 RX ORDER — ROCURONIUM BROMIDE 10 MG/ML
INJECTION, SOLUTION INTRAVENOUS PRN
Status: DISCONTINUED | OUTPATIENT
Start: 2021-04-08 | End: 2021-04-08 | Stop reason: SDUPTHER

## 2021-04-08 RX ORDER — IBUPROFEN 800 MG/1
400 TABLET ORAL EVERY 6 HOURS PRN
Status: DISCONTINUED | OUTPATIENT
Start: 2021-04-08 | End: 2021-04-08 | Stop reason: HOSPADM

## 2021-04-08 RX ORDER — ACETAMINOPHEN 325 MG/1
650 TABLET ORAL EVERY 4 HOURS PRN
Status: DISCONTINUED | OUTPATIENT
Start: 2021-04-08 | End: 2021-04-08 | Stop reason: HOSPADM

## 2021-04-08 RX ORDER — SODIUM CHLORIDE 0.9 % (FLUSH) 0.9 %
10 SYRINGE (ML) INJECTION EVERY 12 HOURS SCHEDULED
Status: DISCONTINUED | OUTPATIENT
Start: 2021-04-08 | End: 2021-04-08 | Stop reason: HOSPADM

## 2021-04-08 RX ORDER — CIPROFLOXACIN 2 MG/ML
INJECTION, SOLUTION INTRAVENOUS PRN
Status: DISCONTINUED | OUTPATIENT
Start: 2021-04-08 | End: 2021-04-08 | Stop reason: SDUPTHER

## 2021-04-08 RX ORDER — PROMETHAZINE HYDROCHLORIDE 25 MG/1
12.5 TABLET ORAL EVERY 6 HOURS PRN
Status: DISCONTINUED | OUTPATIENT
Start: 2021-04-08 | End: 2021-04-08 | Stop reason: HOSPADM

## 2021-04-08 RX ORDER — CIPROFLOXACIN 2 MG/ML
400 INJECTION, SOLUTION INTRAVENOUS
Status: CANCELLED | OUTPATIENT
Start: 2021-04-08 | End: 2021-04-08

## 2021-04-08 RX ORDER — FENTANYL CITRATE 50 UG/ML
50 INJECTION, SOLUTION INTRAMUSCULAR; INTRAVENOUS EVERY 5 MIN PRN
Status: DISCONTINUED | OUTPATIENT
Start: 2021-04-08 | End: 2021-04-08 | Stop reason: HOSPADM

## 2021-04-08 RX ORDER — SODIUM CHLORIDE 9 MG/ML
INJECTION, SOLUTION INTRAVENOUS CONTINUOUS
Status: DISCONTINUED | OUTPATIENT
Start: 2021-04-08 | End: 2021-04-08 | Stop reason: HOSPADM

## 2021-04-08 RX ORDER — MEPERIDINE HYDROCHLORIDE 25 MG/ML
12.5 INJECTION INTRAMUSCULAR; INTRAVENOUS; SUBCUTANEOUS EVERY 5 MIN PRN
Status: DISCONTINUED | OUTPATIENT
Start: 2021-04-08 | End: 2021-04-08 | Stop reason: HOSPADM

## 2021-04-08 RX ORDER — GLYCOPYRROLATE 1 MG/5 ML
SYRINGE (ML) INTRAVENOUS PRN
Status: DISCONTINUED | OUTPATIENT
Start: 2021-04-08 | End: 2021-04-08 | Stop reason: SDUPTHER

## 2021-04-08 RX ORDER — LABETALOL 20 MG/4 ML (5 MG/ML) INTRAVENOUS SYRINGE
5 4 TIMES DAILY PRN
Status: DISCONTINUED | OUTPATIENT
Start: 2021-04-08 | End: 2021-04-08 | Stop reason: HOSPADM

## 2021-04-08 RX ORDER — OXYCODONE HYDROCHLORIDE AND ACETAMINOPHEN 5; 325 MG/1; MG/1
2 TABLET ORAL EVERY 4 HOURS PRN
Status: DISCONTINUED | OUTPATIENT
Start: 2021-04-08 | End: 2021-04-08 | Stop reason: HOSPADM

## 2021-04-08 RX ORDER — SODIUM CHLORIDE 0.9 % (FLUSH) 0.9 %
5-40 SYRINGE (ML) INJECTION PRN
Status: DISCONTINUED | OUTPATIENT
Start: 2021-04-08 | End: 2021-04-08 | Stop reason: HOSPADM

## 2021-04-08 RX ORDER — DIPHENHYDRAMINE HYDROCHLORIDE 50 MG/ML
12.5 INJECTION INTRAMUSCULAR; INTRAVENOUS
Status: DISCONTINUED | OUTPATIENT
Start: 2021-04-08 | End: 2021-04-08 | Stop reason: HOSPADM

## 2021-04-08 RX ORDER — BUPIVACAINE HYDROCHLORIDE 5 MG/ML
INJECTION, SOLUTION EPIDURAL; INTRACAUDAL PRN
Status: DISCONTINUED | OUTPATIENT
Start: 2021-04-08 | End: 2021-04-08 | Stop reason: ALTCHOICE

## 2021-04-08 RX ORDER — ONDANSETRON 2 MG/ML
4 INJECTION INTRAMUSCULAR; INTRAVENOUS EVERY 6 HOURS PRN
Status: DISCONTINUED | OUTPATIENT
Start: 2021-04-08 | End: 2021-04-08 | Stop reason: HOSPADM

## 2021-04-08 RX ORDER — FENTANYL CITRATE 50 UG/ML
INJECTION, SOLUTION INTRAMUSCULAR; INTRAVENOUS PRN
Status: DISCONTINUED | OUTPATIENT
Start: 2021-04-08 | End: 2021-04-08 | Stop reason: SDUPTHER

## 2021-04-08 RX ORDER — MIDAZOLAM HYDROCHLORIDE 2 MG/2ML
INJECTION, SOLUTION INTRAMUSCULAR; INTRAVENOUS PRN
Status: DISCONTINUED | OUTPATIENT
Start: 2021-04-08 | End: 2021-04-08 | Stop reason: SDUPTHER

## 2021-04-08 RX ORDER — DEXTROSE, SODIUM CHLORIDE, SODIUM LACTATE, POTASSIUM CHLORIDE, AND CALCIUM CHLORIDE 5; .6; .31; .03; .02 G/100ML; G/100ML; G/100ML; G/100ML; G/100ML
INJECTION, SOLUTION INTRAVENOUS CONTINUOUS
Status: DISCONTINUED | OUTPATIENT
Start: 2021-04-08 | End: 2021-04-08 | Stop reason: HOSPADM

## 2021-04-08 RX ORDER — SODIUM CHLORIDE 0.9 % (FLUSH) 0.9 %
10 SYRINGE (ML) INJECTION PRN
Status: DISCONTINUED | OUTPATIENT
Start: 2021-04-08 | End: 2021-04-08 | Stop reason: HOSPADM

## 2021-04-08 RX ORDER — DEXAMETHASONE SODIUM PHOSPHATE 10 MG/ML
INJECTION, EMULSION INTRAMUSCULAR; INTRAVENOUS PRN
Status: DISCONTINUED | OUTPATIENT
Start: 2021-04-08 | End: 2021-04-08 | Stop reason: SDUPTHER

## 2021-04-08 RX ORDER — OXYCODONE HYDROCHLORIDE AND ACETAMINOPHEN 5; 325 MG/1; MG/1
1 TABLET ORAL EVERY 6 HOURS PRN
Qty: 28 TABLET | Refills: 0 | Status: SHIPPED | OUTPATIENT
Start: 2021-04-08 | End: 2021-04-15

## 2021-04-08 RX ORDER — ONDANSETRON 4 MG/1
4 TABLET, ORALLY DISINTEGRATING ORAL EVERY 6 HOURS PRN
Qty: 40 TABLET | Refills: 3 | Status: SHIPPED | OUTPATIENT
Start: 2021-04-08 | End: 2021-04-22 | Stop reason: ALTCHOICE

## 2021-04-08 RX ORDER — SODIUM CHLORIDE 9 MG/ML
25 INJECTION, SOLUTION INTRAVENOUS PRN
Status: DISCONTINUED | OUTPATIENT
Start: 2021-04-08 | End: 2021-04-08 | Stop reason: HOSPADM

## 2021-04-08 RX ORDER — NEOSTIGMINE METHYLSULFATE 5 MG/5 ML
SYRINGE (ML) INTRAVENOUS PRN
Status: DISCONTINUED | OUTPATIENT
Start: 2021-04-08 | End: 2021-04-08 | Stop reason: SDUPTHER

## 2021-04-08 RX ORDER — LIDOCAINE HYDROCHLORIDE 20 MG/ML
INJECTION, SOLUTION EPIDURAL; INFILTRATION; INTRACAUDAL; PERINEURAL PRN
Status: DISCONTINUED | OUTPATIENT
Start: 2021-04-08 | End: 2021-04-08 | Stop reason: SDUPTHER

## 2021-04-08 RX ORDER — PROPOFOL 10 MG/ML
INJECTION, EMULSION INTRAVENOUS PRN
Status: DISCONTINUED | OUTPATIENT
Start: 2021-04-08 | End: 2021-04-08 | Stop reason: SDUPTHER

## 2021-04-08 RX ORDER — ONDANSETRON 4 MG/1
TABLET, ORALLY DISINTEGRATING ORAL
Status: COMPLETED
Start: 2021-04-08 | End: 2021-04-08

## 2021-04-08 RX ADMIN — DEXAMETHASONE SODIUM PHOSPHATE 4 MG: 10 INJECTION, EMULSION INTRAMUSCULAR; INTRAVENOUS at 11:30

## 2021-04-08 RX ADMIN — FENTANYL CITRATE 50 MCG: 50 INJECTION, SOLUTION INTRAMUSCULAR; INTRAVENOUS at 12:53

## 2021-04-08 RX ADMIN — ONDANSETRON 4 MG: 4 TABLET, ORALLY DISINTEGRATING ORAL at 16:29

## 2021-04-08 RX ADMIN — ROCURONIUM BROMIDE 10 MG: 10 INJECTION INTRAVENOUS at 12:15

## 2021-04-08 RX ADMIN — Medication 3 MG: at 13:05

## 2021-04-08 RX ADMIN — LIDOCAINE HYDROCHLORIDE 60 MG: 20 INJECTION, SOLUTION EPIDURAL; INFILTRATION; INTRACAUDAL; PERINEURAL at 11:26

## 2021-04-08 RX ADMIN — ROCURONIUM BROMIDE 5 MG: 10 INJECTION INTRAVENOUS at 11:26

## 2021-04-08 RX ADMIN — FENTANYL CITRATE 100 MCG: 50 INJECTION, SOLUTION INTRAMUSCULAR; INTRAVENOUS at 11:26

## 2021-04-08 RX ADMIN — SODIUM CHLORIDE: 9 INJECTION, SOLUTION INTRAVENOUS at 12:55

## 2021-04-08 RX ADMIN — SODIUM CHLORIDE: 9 INJECTION, SOLUTION INTRAVENOUS at 10:52

## 2021-04-08 RX ADMIN — OXYCODONE HYDROCHLORIDE AND ACETAMINOPHEN 1 TABLET: 5; 325 TABLET ORAL at 15:03

## 2021-04-08 RX ADMIN — KETOROLAC TROMETHAMINE 30 MG: 30 INJECTION, SOLUTION INTRAMUSCULAR; INTRAVENOUS at 14:00

## 2021-04-08 RX ADMIN — SODIUM CHLORIDE: 9 INJECTION, SOLUTION INTRAVENOUS at 14:40

## 2021-04-08 RX ADMIN — METRONIDAZOLE 500 MG: 500 INJECTION, SOLUTION INTRAVENOUS at 11:33

## 2021-04-08 RX ADMIN — FENTANYL CITRATE 50 MCG: 50 INJECTION, SOLUTION INTRAMUSCULAR; INTRAVENOUS at 11:52

## 2021-04-08 RX ADMIN — SODIUM CHLORIDE: 9 INJECTION, SOLUTION INTRAVENOUS at 11:20

## 2021-04-08 RX ADMIN — CIPROFLOXACIN 400 MG: 2 INJECTION, SOLUTION INTRAVENOUS at 11:43

## 2021-04-08 RX ADMIN — FENTANYL CITRATE 50 MCG: 50 INJECTION, SOLUTION INTRAMUSCULAR; INTRAVENOUS at 11:42

## 2021-04-08 RX ADMIN — ONDANSETRON HYDROCHLORIDE 4 MG: 4 INJECTION, SOLUTION INTRAMUSCULAR; INTRAVENOUS at 13:07

## 2021-04-08 RX ADMIN — PROPOFOL 170 MG: 10 INJECTION, EMULSION INTRAVENOUS at 11:26

## 2021-04-08 RX ADMIN — Medication 0.6 MG: at 13:05

## 2021-04-08 RX ADMIN — MIDAZOLAM HYDROCHLORIDE 2 MG: 1 INJECTION, SOLUTION INTRAMUSCULAR; INTRAVENOUS at 11:19

## 2021-04-08 RX ADMIN — ROCURONIUM BROMIDE 35 MG: 10 INJECTION INTRAVENOUS at 11:27

## 2021-04-08 ASSESSMENT — PULMONARY FUNCTION TESTS
PIF_VALUE: 29
PIF_VALUE: 18
PIF_VALUE: 31
PIF_VALUE: 28
PIF_VALUE: 28
PIF_VALUE: 14
PIF_VALUE: 30
PIF_VALUE: 29
PIF_VALUE: 30
PIF_VALUE: 29
PIF_VALUE: 31
PIF_VALUE: 28
PIF_VALUE: 17
PIF_VALUE: 14
PIF_VALUE: 31
PIF_VALUE: 28
PIF_VALUE: 28
PIF_VALUE: 31
PIF_VALUE: 14
PIF_VALUE: 5
PIF_VALUE: 29
PIF_VALUE: 28
PIF_VALUE: 26
PIF_VALUE: 18
PIF_VALUE: 4
PIF_VALUE: 5
PIF_VALUE: 5
PIF_VALUE: 0
PIF_VALUE: 19
PIF_VALUE: 5
PIF_VALUE: 19
PIF_VALUE: 22
PIF_VALUE: 14
PIF_VALUE: 27
PIF_VALUE: 29
PIF_VALUE: 18
PIF_VALUE: 17
PIF_VALUE: 24
PIF_VALUE: 17
PIF_VALUE: 29
PIF_VALUE: 31
PIF_VALUE: 19
PIF_VALUE: 26
PIF_VALUE: 29
PIF_VALUE: 14
PIF_VALUE: 29
PIF_VALUE: 29
PIF_VALUE: 28
PIF_VALUE: 28
PIF_VALUE: 18
PIF_VALUE: 18
PIF_VALUE: 29
PIF_VALUE: 29
PIF_VALUE: 14
PIF_VALUE: 29
PIF_VALUE: 31
PIF_VALUE: 19
PIF_VALUE: 31
PIF_VALUE: 18
PIF_VALUE: 17
PIF_VALUE: 19
PIF_VALUE: 19
PIF_VALUE: 14
PIF_VALUE: 31
PIF_VALUE: 19
PIF_VALUE: 13
PIF_VALUE: 28
PIF_VALUE: 0
PIF_VALUE: 29
PIF_VALUE: 0

## 2021-04-08 ASSESSMENT — PAIN - FUNCTIONAL ASSESSMENT: PAIN_FUNCTIONAL_ASSESSMENT: 0-10

## 2021-04-08 ASSESSMENT — PAIN SCALES - GENERAL
PAINLEVEL_OUTOF10: 8
PAINLEVEL_OUTOF10: 7

## 2021-04-08 ASSESSMENT — PAIN SCALES - WONG BAKER: WONGBAKER_NUMERICALRESPONSE: 0

## 2021-04-08 NOTE — H&P
H&P Update    Patient's History and Physical from my office was reviewed. Patient examined. There has been no change.     Judy Meza MD

## 2021-04-08 NOTE — BRIEF OP NOTE
Brief Postoperative Note      Patient: Lynn Bennett  YOB: 1965  MRN: [id number]    Date of Procedure: 4/8/2021    Pre-Op Diagnosis: POST MENOPAUSAL BLEEDING, UMBILICAL HERNIA    Post-Op Diagnosis: Same       Procedure(s):  ROBOTIC HYSTERECTOMY WITH BSO  OPEN UMBILICAL HERNIA REPAIR WITH MESH, incarcerated    Surgeon(s):  MD Ethan Viramontes MD    Assistant:  First Assistant: Vladislav Hernandez RN    Anesthesia: General    Estimated Blood Loss (mL): Minimal    Complications: None    Specimens:   ID Type Source Tests Collected by Time Destination   A : Uterus, bilateral tubes & ovaries Tissue Uterus SURGICAL PATHOLOGY Elisa Mcgee MD 4/8/2021 1211        Implants:  Implant Name Type Inv.  Item Serial No.  Lot No. LRB No. Used Action   PATCH JENNIFER SM DIA1.7IN CIR W/ STRP [employer] ABSRB  PATCH JENNIFER SM DIA1.7IN CIR W/ STRP [employer] ABSRB  BARD DAVOL-WD [id number] N/A 1 Implanted         Drains:   Closed/Suction Drain Right Breast (Active)       Closed/Suction Drain Right Breast Bulb (Active)       Closed/Suction Drain Left Breast Bulb (Active)       Closed/Suction Drain Left Breast Bulb (Active)       Closed/Suction Drain Right Breast Bulb (Active)       Closed/Suction Drain Left Breast Bulb (Active)       Closed/Suction Drain Right Breast Bulb (Active)       Urethral Catheter 16 fr (Active)       Findings:     Electronically signed by Ethan Huber MD on 4/8/2021 at 1:19 PM

## 2021-04-08 NOTE — PROGRESS NOTES
1324: Patient to phase 1 recovery room via cart. Patient is drowsy but arouses easily to name. Patient placed on monitor and vitals obtained, see charting. Report received from surgical RN, Ashleigh Anderson and Lane Burns CRNA. Patient is on room air at this time. 1326: IV is infusing into her right hand. Surgical glue to incision sites on abdomen x4 and is clean, dry and intact- see LDA. Donita pad is in place and is clean, dry and intact. 1327: SCD's attached. Ice pack applied to abdomen for pain control. Oxygen applied at 2 liters due to pulse ox dropping to 90% on room air- pulse ox increases to 96%. 1332: Patient is resting in bed with eyes closed, respirations are even and unlabored. Vital signs remain stable- will continue to monitor. 1334: Patient's pulse ox is 98% on 2 liters- oxygen decreased to 1 liter. 1336: Patient arouses easily to name and is more awake. Patient is denying nausea and rating pain a \"8\"/10. Patient wanting to wait on any IV pain medication stating \"let's wait and see how I feel. \"   1155: Patient's pulse ox is 96% on 1 liter- oxygen removed at this time. Patient is resting in bed with eyes closed and snoring at times, respirations are even and unlabored. Vital signs remain stable at this time. [phone] [id number] 21: Patient continuing denying IV pain medications and is wishing to wait for pain pills in phase 2.   1354: Patient meets criteria to be discharged from phase 1 to phase 2. Patient moved to phase 2 at this time. 1356: Offered drink and snack provided to the patient. Bed is in the lowest position and call light is within reach. Patient's  is at the bedside. Patient instructed once she eats some crackers to put the call light on and this RN will get her a pain pill- the patient verbalized understanding. 1400: 30 mg of Toradol given IV for a pain level of \"8\"/10- see MAR. Patient stating she is just very sleepy. 1415: Patient is resting in bed with eyes closed.   remains at the bedside. 1421: Patient arouses easily to name. Patient stating the Toradol did help her pain some. Patient's  remains at the bedside. 1428: Patient is slowly starting to wake up more and is drinking/eating her snack. 1437: Patient is resting in bed with eyes closed. 1440: More fluids hung- see MAR. Patient is awake and eating her cracker.  remains at the bedside and call light is within reach. 1503: Percocet given to the patient by Ramakrishna Georges RN- see MAR.   1733: Patient stating her pain is getting better and would like to use the bathroom. IV is continuing to infuse. 1537: Patient to the bathroom at this time, gait steady. 1545: Patient back to her room at this time. She stated she was able to void a small amount. 1548: Patient is denying nausea and stating she is in pain. Patient is denying wanting another pain pill or IM Toradol.   1602: Patient stating she feels better after resting and wants to try and get ready to leave to go home. 1606: IV removed at this time- no complications and dressing applied. 26: Patient's  is assisting her with getting dressed. 1618: Patient to the bathroom again at this time- she stated she was able to void. 1625: Discharge instructions and prescription given and explained to the patient and the patient's , both verbalized understanding. 1627: Patient stating she is nauseous and is laying down on the bed.   1629: Zofran-ODT given- see MAR. Next dose adjusted on her discharge instructions. 1636: Patient stating she is ready to leave. While getting into the wheelchair patient got nauseous again and vomited 50 ml of green- yellow emesis. 1638: Patient chewing gum and stating she feels much better now and wants to go home. 1642: Patient wheeled to the car and discharged home in stable condition with her .

## 2021-04-08 NOTE — OP NOTE
Brief Operative Report      Pre-operative Diagnosis:  Breast Ca with in situ ovaries    Post-operative Diagnosis:  Same    Procedure:  Robotic [address] BSO    Surgeon: Ruth Joseph    Assistant Surgeons: none     Anesthesia:  General endotrachial anesthesia    Estimated blood loss: 50 cc's     Findings: See Operative Dictation    Complications:  none      See dictated operative report for full details.       Pierce Lu MD

## 2021-04-08 NOTE — ANESTHESIA POSTPROCEDURE EVALUATION
Department of Anesthesiology  Postprocedure Note    Patient: Kristy Javed  MRN: [id number]  YOB: 1965  Date of evaluation: 4/8/2021  Time:  2:58 PM     Procedure Summary     Date: 04/08/21 Room / Location: [facility] 05 / [facility]    Anesthesia Start: 1120 Anesthesia Stop: 2931    Procedures:       ROBOTIC HYSTERECTOMY WITH BSO (N/A Uterus)      OPEN UMBILICAL HERNIA REPAIR WITH MESH (N/A ) Diagnosis: (POST MENOPAUSAL BLEEDING, UMBILICAL HERNIA)    Surgeons: Celi Lemons MD; Ozzie Uribe MD Responsible Provider: Brigid Scheuermann, MD    Anesthesia Type: general ASA Status: 2          Anesthesia Type: general    Irving Phase I: Irving Score: 9    Irving Phase II: Irving Score: 9    Last vitals: Reviewed and per EMR flowsheets.        Anesthesia Post Evaluation    Patient location during evaluation: PACU  Level of consciousness: awake  Complications: no  Cardiovascular status: hemodynamically stable  Respiratory status: acceptable

## 2021-04-08 NOTE — H&P
[address] [address]  History and Physical Update    Pt Name: Radha Maxwell  MRN: [id number]  YOB: 1965  Date of evaluation: 4/8/2021    [x] I have examined the patient and reviewed the H&P/Consult and there are no changes to the patient or plans. [] I have examined the patient and reviewed the H&P/Consult and have noted the following changes:        Sven Salcedo MD  Electronically signed 4/8/2021 at 8:09 Darwin Lin MD   General Surgery  New Patient Evaluation in Office  Pt Name: Radha Maxwell  Date of Birth 1965   Today's Date: 3/15/2021  Medical Record Number: [id number]  Referring Provider: Lisa Granger  Primary Care Provider: Gabriel Raphael MD  Chief Complaint:       Chief Complaint   Patient presents with   [facility] Consult       established pt--ref by Dr. Lisa Granger for umbilical hernia-combination surgery 4/8         ASSESSMENT   1. Incarcerated incisional hernia    2. Menorrhagia with irregular cycle    3. Malignant neoplasm of female breast, unspecified estrogen receptor status, unspecified laterality, unspecified site of breast ([facility] [address]       PLANS   1. Schedule patient for open repair of incarcerated incisional hernia at time of her robotic assisted laparoscopic hysterectomy  2. General anesthesia  3. Preoperative testing per anesthesia guidelines  4. Techniques and risks of repair were discussed with Diana Rhodes. As a small hernia would recommend a primary repair with suture. I do not feel she needs mesh she is reluctant for mesh placement. Minimally invasive procedure would only add port sites and I do not think that is indicated at this point. Risks were discussed and include recurrence of hernia as well as bleeding and wound infections. All questions answered.   Patient wishes to proceed at the time of her already scheduled hysterectomy.  Joi Lambert is a 54y.o. year old female who is presenting today in the office for evaluation of an incarcerated incisional port site hernia. She had a remote laparoscopic cholecystectomy and has an incarcerated hernia at the camera port site in the mid abdomen. She has a recent history of menorrhagia and is undergoing a robotic assisted laparoscopic hysterectomy. She has a personal history of left breast cancer and underwent bilateral mastectomy with reconstruction in 2015. She continues on tamoxifen therapy. She required no other adjuvant treatment. She denies any change in bowel habits. She has a small incarcerated port site hernia in the mid abdomen. She has had no change in bowel or urinary habits. Dr. Michael Rodriguez plans hysterectomy she wishes to have her hernia repaired at that time. She has had a history of dysfunctional uterine bleeding since at least 2016 underwent a previous hysteroscopy. She opted to proceed with hysterectomy.   She is stopping her tamoxifen soon after completing 5 years of oral therapy.     Past Medical History  Past Medical History        Past Medical History:   Diagnosis Date    Cancer ([facility] [address] 2015, jan.     breast, left    Kidney stone      Thyroid disease            Past Surgical History  Past Surgical History         Past Surgical History:   Procedure Laterality Date    BREAST BIOPSY Right 2013    BREAST RECONSTRUCTION   6/30/15     replace right tissue expander, revise left breast scar    BREAST SURGERY   02/2015     bilateral mastectomy - left breast cancer    CHOLECYSTECTOMY   1990     laparoscopic    COLONOSCOPY         last one 2013    COSMETIC SURGERY   10/2015     bilateral breast reconstruction    DILATION AND CURETTAGE OF UTERUS   1994    FOOT SURGERY   1983     left foot spur    MASTECTOMY Bilateral 2/11/15     BILATERAL MASTECTOMIES WITH LEFT SENTINEL NODE AND BILATERAL RECONSTRUCTION WITH DERMAL AND TISSUE EXPANDERS    TONSILLECTOMY         adnoids as child          Medications  Current Facility-Administered Medications Current Outpatient Medications   Medication Sig Dispense Refill    Ascorbic Acid (VITAMIN C) 250 MG tablet Take 250 mg by mouth daily        OLIVE LEAF PO Take by mouth        Zinc Sulfate (ZINC 15 PO) Take by mouth        lisinopril (PRINIVIL;ZESTRIL) 10 MG tablet Take 10 mg by mouth daily Take 2 tabs daily        loratadine (CLARITIN) 10 MG tablet Take 10 mg by mouth as needed        levothyroxine (SYNTHROID) 100 MCG tablet Take 100 mcg by mouth Daily.        calcium citrate-vitamin D (CITRICAL + D) 315-250 MG-UNIT TABS Take  by mouth 2 times daily.        tamoxifen (NOLVADEX) 10 MG tablet Take 10 mg by mouth daily           No current facility-administered medications for this visit.          Allergies           Allergies   Allergen Reactions    Augmentin [Amoxicillin-Pot Clavulanate] Hives, Diarrhea and Swelling    Hydantoins      Other         Sodium pentathol  Makes me a \"crazy person\"  Steri strips caused redness and rash  bandaids - rash    Pcn [Penicillins]      Biaxin [Clarithromycin] Rash    Norco [Hydrocodone-Acetaminophen] Other (See Comments)       Diaphoretic, flushed    Vancomycin Itching and Rash       Family History  Family History         Family History   Problem Relation Age of Onset    Breast Cancer Maternal Aunt           age 42's at onset    Heart Disease Mother      Heart Disease Father      Asthma Maternal Grandmother      Heart Disease Paternal Grandfather      Heart Attack Maternal Grandfather      Ovarian Cancer Neg Hx      Diabetes Neg Hx      Kidney Disease Neg Hx      Stroke Neg Hx            SocialHistory  Social History               Socioeconomic History    Marital status:        Spouse name: Not on file    Number of children: Not on file    Years of education: Not on file    Highest education level: Not on file   Occupational History    Not on file   Social Needs    Financial resource strain: Not on file    Food insecurity       Worry: Not on file       Inability: Not on file    Transportation needs       Medical: Not on file       Non-medical: Not on file   Tobacco Use    Smoking status: Never Smoker    Smokeless tobacco: Never Used   Substance and Sexual Activity    Alcohol use: Yes       Comment: social    Drug use: No    Sexual activity: Not on file   Lifestyle    Physical activity       Days per week: Not on file       Minutes per session: Not on file    Stress: Not on file   Relationships    Social connections       Talks on phone: Not on file       Gets together: Not on file       Attends Alevism service: Not on file       Active member of club or organization: Not on file       Attends meetings of clubs or organizations: Not on file       Relationship status: Not on file    Intimate partner violence       Fear of current or ex partner: Not on file       Emotionally abused: Not on file       Physically abused: Not on file       Forced sexual activity: Not on file   Other Topics Concern    Not on file   Social History Narrative    Not on file              Review of Systems  Review of Systems   Constitutional: Negative for chills, fatigue, fever and unexpected weight change. HENT: Negative for sore throat, trouble swallowing and voice change. Eyes: Negative for photophobia and visual disturbance. Respiratory: Negative for cough, shortness of breath and wheezing. Cardiovascular: Negative for chest pain and palpitations. Gastrointestinal: Positive for abdominal pain. Negative for blood in stool, constipation, nausea and vomiting. Intermittent pain at site of hernia   Endocrine: Negative for cold intolerance, heat intolerance and polydipsia. Genitourinary: Positive for vaginal bleeding. Negative for difficulty urinating, dysuria, flank pain and hematuria. Musculoskeletal: Negative for gait problem, joint swelling and myalgias. Skin: Negative for color change and rash.    Allergic/Immunologic: Negative for immunocompromised state. Neurological: Negative for tremors, seizures, speech difficulty and headaches. Hematological: Negative for adenopathy. Does not bruise/bleed easily. Psychiatric/Behavioral: Negative for behavioral problems and confusion. The patient is not nervous/anxious.          OBJECTIVE      /80 (Site: Right Upper Arm, Position: Sitting, Cuff Size: Medium Adult)   Pulse 67   Temp 97.1 °F (36.2 °C) (Tympanic)   Resp 15   Ht 5' 8\" (1.727 m)   Wt 183 lb (83 kg)   SpO2 97%   BMI 27.83 kg/m²       Physical Exam  Vitals signs reviewed. Constitutional:       Appearance: Normal appearance. She is well-developed. She is not ill-appearing or diaphoretic. HENT:      Head: Normocephalic and atraumatic. Nose: No congestion. Eyes:      General: No scleral icterus. Extraocular Movements: Extraocular movements intact. Pupils: Pupils are equal, round, and reactive to light. Neck:      Musculoskeletal: Neck supple. Thyroid: No thyromegaly. Vascular: No JVD. Trachea: No tracheal deviation. Cardiovascular:      Rate and Rhythm: Normal rate and regular rhythm. Heart sounds: Normal heart sounds. No murmur. Pulmonary:      Effort: Pulmonary effort is normal. No respiratory distress. Breath sounds: Normal breath sounds. No wheezing. Abdominal:      General: There is no distension. Palpations: Abdomen is soft. Tenderness: There is no abdominal tenderness. There is no guarding or rebound. Hernia: A hernia is present. Musculoskeletal: Normal range of motion. Lymphadenopathy:      Cervical: No cervical adenopathy. Skin:     General: Skin is warm and dry. Findings: No rash. Neurological:      General: No focal deficit present. Mental Status: She is alert and oriented to person, place, and time. Cranial Nerves: No cranial nerve deficit. Psychiatric:         Mood and Affect: Mood normal.         Thought Content:  Thought content normal.                  Lab Results   Component Value Date     WBC 7.2 03/31/2020     HGB 13.0 03/31/2020     HCT 40.8 03/31/2020      03/31/2020     ALT 61 03/31/2020     AST 39 03/31/2020      03/31/2020     K 4.0 03/31/2020      03/31/2020     CREATININE 0.7 03/31/2020     BUN 14 03/31/2020     CO2 23 03/31/2020     TSH 2.010 04/23/2020              #[id number] - MRI BREAST BILATERAL W WO CONTRAST   BREAST MRI OF THE LEFT BREAST : 12/9/2020   CLINICAL: Malignant neoplasm Breast, s/p mastectomy with silicone    implants.         Comparison is made to exams dated:  2/2/2015 breast MRI,    1/22/2015 mammogram, and 1/19/2015 mammogram - [facility].         TECHNIQUE:  Routine MRI of the bilateral breasts in a dedicated    breast coil.  Axial T1 gradient imaging of the bilateral breast    was performed without and with IV contrast.  The post contrast    imaging was performed dynamically.  Sagittal T1 gradient imaging    was also performed through both breasts.  Additional STIR imaging    was performed through the breasts in the axial plane and the    thorax in the coronal plane.  The breast MRI examination was    evaluated on a [employer] work station with 3D reconstruction and    time activity curves.             FINDINGS:   There are bilateral mastectomies with bilateral retropectoral    implant recomstructions. The bilateral breast implants are    intact.       There is a 0.3 x 0.6 x 0.6 cm circumscribed enhancing oval nodule    lower outer quadrant right breast (image 62) with predominently    washout delayed enhancement kinetics. This could possibly    represent a lymph node. A second look ultrasound is recommended.       There is a 0.5 cm circumscribed enhancing round nodule lower    outer quadrant of the right breast middle depth (image 43) which    abutts the lateral margin of the implant.  A second look    ultrasound is recommended.       There are no other pathologically enlarged lymph nodes.       There is thyromegaly.       There is no musculoskeletal abnormality.       The imaging of the upper abdomen is unremarkable.               IMPRESSION: INCOMPLETE: NEEDS ADDITIONAL IMAGING EVALUATION    1. There are bilateral mastectomies with bilateral retropectoral    implant recomstructions. The bilateral breast implants are    intact.       2. There is a 0.3 x 0.6 x 0.6 cm circumscribed enhancing oval    nodule lower outer quadrant right breast (image 62) with    predominently washout delayed enhancement kinetics. This could    possibly represent a lymph node. A second look ultrasound is    recommended.       3. There is a 0.5 cm circumscribed enhancing round nodule lower    outer quadrant of the right breast middle depth (image 43) which    abutts the lateral margin of the implant. A second look    ultrasound is recommended.       4. The patient has been or will be contacted.                     Brianna Delarosa M.D.     pgk/:12/9/2020 17:35:40     copy to: Doculynxjean Gil. Bill Álvarez., [facility] [place], [place]: [phone], fax: [phone]       Imaging Technologist: Dilshad Castorena RT(R)(MR), [address]   letter sent: Additional Tests Needed     MRI BI-RADS: Category 0: Incomplete: Needs Additional Imaging    Evaluation   [id number]                        Narrative       IMPRESSION: Ultrasound BI-RADS: Category 3: Probably Benign   1. There is a 0.8 cm x 0.4 cm x 0.7 cm oval lymph node with a    circumscribed margin in the right breast lower outer aspect    middle depth 7 cm from the nipple which is probably benign.         2. There is no sonographic correlate for the 0.5 cm nodular focus    of signal lower outer quadrant right breast middle depth    abutting the lateral aspect of the implant. This could represent    magnetic susceptibility artifact from the previous surgery.       3.  A follow-up right ultrasound in 6 months is recommended to demonstrate stability.         4. The patient has been or will be contacted.                 #[id number] - US BREAST LIMITED RIGHT   ULTRASOUND OF RIGHT BREAST AND RIGHT AXILLA: 12/11/2020   CLINICAL: Ultrasound suggested after abnormal MRI of the breasts.            Comparison is made to exams dated:  12/9/2020 breast MRI and    2/2/2015 breast MRI - [address] [address].     Ultrasound of the right breast and axilla was performed on the    areas of interest.  Gray scale images of the real-time    examination were reviewed.     There is a 0.8 cm x 0.4 cm x 0.7 cm oval lymph node with a    circumscribed margin in the right breast lower outer aspect    middle depth 7 cm from the nipple.  This oval lymph node is    hypoechoic with fatty hilum. There is no associated cortical    thickening. This correlates with breast MRI findings.     There is no sonographic correlate to account for the 0.5 cm    nodular focus of signal lower outer quadrant right breast middle    depth abutting the lateral aspect of the implant. This could    represent magnetic susceptibility artifact from the previous    surgery. No abnormalities were seen sonographically in the right axilla.             Asuncion Vickers M.D.     pgk/:12/11/2020 15:53:45     copy to: Valentine Castillo.  Sushila Ritchie., [facility] [place], [place]: [phone], fax: [phone]   copy to: Dr Christopher Rodriguez MD, [facility] ph:    [phone], fax: [phone]       Imaging Technologist: Lashae LINK(R)(M), [address]   letter sent: 6 Month Followup Recommended        [id number]

## 2021-04-08 NOTE — ANESTHESIA PRE PROCEDURE
Department of Anesthesiology  Preprocedure Note       Name:  Jey Michelle   Age:  54 y.o.  :  1965                                          MRN:  [id number]         Date:  2021      Surgeon: Rm Llamas):  MD Yecenia Belcher MD    Procedure: Procedure(s):  ROBOTIC HYSTERECTOMY WITH BSO  OPEN UMBILICAL HERNIA REPAIR    Medications prior to admission:   Prior to Admission medications    Medication Sig Start Date End Date Taking? Authorizing Provider   lisinopril (PRINIVIL;ZESTRIL) 10 MG tablet Take 10 mg by mouth daily Take 2 tabs daily   Yes Historical Provider, MD   levothyroxine (SYNTHROID) 100 MCG tablet Take 100 mcg by mouth Daily. Yes Historical Provider, MD   Ascorbic Acid (VITAMIN C) 250 MG tablet Take 250 mg by mouth daily    Historical Provider, MD   OLIVE LEAF PO Take by mouth    Historical Provider, MD   Zinc Sulfate (ZINC 15 PO) Take by mouth    Historical Provider, MD   loratadine (CLARITIN) 10 MG tablet Take 10 mg by mouth as needed    Historical Provider, MD   calcium citrate-vitamin D (CITRICAL + D) 315-250 MG-UNIT TABS Take  by mouth 2 times daily. Historical Provider, MD       Current medications:    Current Facility-Administered Medications   Medication Dose Route Frequency Provider Last Rate Last Admin    0.9 % sodium chloride infusion   Intravenous Continuous Trevor Faith  mL/hr at 21 1052 New Bag at 21 1052    sodium chloride flush 0.9 % injection 10 mL  10 mL Intravenous 2 times per day Trevor Faith MD        sodium chloride flush 0.9 % injection 10 mL  10 mL Intravenous PRN Trevor Faith MD        ceFAZolin (ANCEF) 2000 mg in dextrose 5 % 50 mL IVPB  2,000 mg Intravenous On Call to [address] [place] [address] Dr LONNIE MD           Allergies:     Allergies   Allergen Reactions    Augmentin [Amoxicillin-Pot Clavulanate] Hives, Diarrhea and Swelling    Hydantoins     Other      Sodium pentathol  Makes me a \"crazy person\"  Steri strips caused redness and rash  bandaids - rash    Pcn [Penicillins]     Biaxin [Clarithromycin] Rash    Norco [Hydrocodone-Acetaminophen] Other (See Comments)     Diaphoretic, flushed    Vancomycin Itching and Rash       Problem List:    Patient Active Problem List   Diagnosis Code    [facility] C50.919       Past Medical History:        Diagnosis Date    Cancer ([facility] [address] 2015, jan.    breast, left    Hypertension     Kidney stone     Thyroid disease        Past Surgical History:        Procedure Laterality Date    BREAST BIOPSY Right 2013    BREAST RECONSTRUCTION  6/30/15    replace right tissue expander, revise left breast scar    BREAST SURGERY  02/2015    bilateral mastectomy - left breast cancer    CHOLECYSTECTOMY  1990    laparoscopic    COLONOSCOPY      last one 2013    COSMETIC SURGERY  10/2015    bilateral breast reconstruction    DILATION AND CURETTAGE OF UTERUS  1994 [address]    left foot spur    MASTECTOMY Bilateral 2/11/15    BILATERAL MASTECTOMIES WITH LEFT SENTINEL NODE AND BILATERAL RECONSTRUCTION WITH DERMAL AND TISSUE EXPANDERS    TONSILLECTOMY      adnoids as child       Social History:    Social History     Tobacco Use    Smoking status: Never Smoker    Smokeless tobacco: Never Used   Substance Use Topics    Alcohol use: Yes     Comment: social                                Counseling given: Not Answered      Vital Signs (Current):   Vitals:    04/08/21 1030   BP: (!) 176/99   Pulse: 87   Resp: 16   Temp: 97.1 °F (36.2 °C)   TempSrc: Temporal   SpO2: 99%   Weight: 180 lb 6.4 oz (81.8 kg)   Height: 5' 8\" (1.727 m)                                              BP Readings from Last 3 Encounters:   04/08/21 (!) 176/99   03/15/21 132/80   03/06/17 122/74       NPO Status: Time of last liquid consumption: 2315                        Time of last solid consumption: 2000                        Date of last liquid consumption: 04/07/21                        Date of last solid food consumption: 04/07/21    BMI:   Wt Readings from Last 3 Encounters:   04/08/21 180 lb 6.4 oz (81.8 kg)   03/15/21 183 lb (83 kg)   03/06/17 170 lb (77.1 kg)     Body mass index is 27.43 kg/m². CBC:   Lab Results   Component Value Date    WBC 7.1 04/01/2021    RBC 4.93 04/01/2021    HGB 13.6 04/01/2021    HCT 43.5 04/01/2021    MCV 88.2 04/01/2021    RDW 13.7 06/28/2016     04/01/2021       CMP:   Lab Results   Component Value Date     04/01/2021    K 4.1 04/01/2021    CL 99 04/01/2021    CO2 28 04/01/2021    BUN 16 04/01/2021    CREATININE 0.9 04/01/2021    LABGLOM 65 04/01/2021    GLUCOSE 81 04/01/2021    PROT 7.8 03/31/2020    CALCIUM 10.2 04/01/2021    BILITOT 0.4 03/31/2020    ALKPHOS 79 03/31/2020    AST 39 03/31/2020    ALT 61 03/31/2020       POC Tests: No results for input(s): POCGLU, POCNA, POCK, POCCL, POCBUN, POCHEMO, POCHCT in the last 72 hours. Coags: No results found for: PROTIME, INR, APTT    HCG (If Applicable):   Lab Results   Component Value Date    PREGTESTUR negative 04/08/2021    PREGSERUM NEGATIVE 01/31/2015        ABGs: No results found for: PHART, PO2ART, JEW6KPU, PBN3PQT, BEART, M1WWVDWV     Type & Screen (If Applicable):  No results found for: LABABO, LABRH    Drug/Infectious Status (If Applicable):  No results found for: HIV, HEPCAB    COVID-19 Screening (If Applicable):   Lab Results   Component Value Date    COVID19 Not Detected 04/01/2021           Anesthesia Evaluation    Airway: Mallampati: II       Mouth opening: > = 3 FB Dental:          Pulmonary:       (-) COPD                           Cardiovascular:    (+) hypertension:,                   Neuro/Psych:               GI/Hepatic/Renal:             Endo/Other:                     Abdominal:           Vascular:                                        Anesthesia Plan      general     ASA 2       Induction: intravenous. Anesthetic plan and risks discussed with patient.       Plan discussed with Pau Edouard MD   4/8/2021

## 2021-04-09 ENCOUNTER — TELEPHONE (OUTPATIENT)
Dept: SURGERY | Age: 56
End: 2021-04-09

## 2021-04-09 NOTE — OP NOTE
[address] [id number]                                OPERATIVE REPORT    PATIENT NAME: Dee Larry              :        1965  MED REC NO:   [id number]                           ROOM:  ACCOUNT NO:   [de-identified]                           ADMIT DATE: 2021  PROVIDER:     Trinity Paige M.D.    Mariveljhonny Raymondloreto:  2021    PREOPERATIVE DIAGNOSIS:  Breast cancer, ER positive. POSTOPERATIVE DIAGNOSIS:  Breast cancer, ER positive. OPERATION PERFORMED:  Robotically assisted total laparoscopic  hysterectomy with bilateral salpingo-oophorectomy. SURGEON:  Gonzales Mckeon MD    ESTIMATED BLOOD LOSS:  Approximately 50 mL. PATHOLOGY SPECIMEN:  Bilateral tubes, ovaries, uterus, and cervix. DESCRIPTION OF PROCEDURE:  The patient was taken to the operating room,  at which time she underwent general anesthetic. She was placed in the  dorsal lithotomy position, sterilely prepped and draped in the usual  manner. A scalpel was used to make a supraumbilical stab incision. This was elevated and a Veress needle introduced. Proper position was  confirmed by the drop test.  Once approximately 4.5 liters of CO2 was  instilled, the Veress needle was removed and a trocar placed. Proper  positioning was reconfirmed by the camera. The remaining laparoscopic  ports were then placed under direct visualization without complication. The Page was then placed to straight drain, and the large VCare  manipulator was placed into the uterus. The robot was then brought and  docked. The hot joel were placed on the right hand and the vessel  sealer on the left. Pelvis was inspected and found to be normal  throughout. The left round ligament was then thoroughly cauterized and  transected, opening up the retroperitoneal space on that side. The  infundibulopelvic ligament was then further developed.   A window was then made in the peritoneum beneath this, and then the ligament was then  reflected up and over the pelvic brim. Once this was accomplished, this  was then thoroughly cauterized and transected removing the left adnexa. Posterior leaf of the broad ligament was taken down to the uterosacral.   The anterior leaf was then taken down towards the VCare cup. The right  round ligament was then thoroughly cauterized and transected, opening up  the retroperitoneal space on that side. The infundibulopelvic ligament  was then further skeletonized. A window was then made beneath this and  was reflected up and over the pelvic brim. The ureter was identified  well out of the operative field. This pedicle was then thoroughly  cauterized and transected, removing the right adnexa. Posterior leaf of  the broad ligament was then taken down to the uterosacral.  Anterior  leaf was then taken down to meet the prior incision. Meticulous  dissection then ensued dissecting the bladder off the VCare cup and  upper vagina. Bilateral uterine vessels were then skeletonized. Bilateral uterine vessels were then cauterized high on the specimen,  again at the level of the VCare cup. These were then transected. Another bite along the top of the VCare cup was then taken to ensure  proper hemostasis. An inverted U incision was then made around the  vessels, to dissect them completely off the VCare cup. Once the VCare  cup was circumferentially visualized, a colpotomy was made posteriorly  between the uterosacrals, was brought around anteriorly truncating the  specimen. The specimen was then removed into the vagina, sent for  pathologic diagnosis. The vaginal cuff was then closed with a running  2-0 V-Loc stitch, starting at the right uterosacral and coming back  towards midline. A second deeper imbricated stitch was then placed for  vaginal cuff support. The pelvis was then irrigated with copious  amounts of sterile saline. Rosalie was then put down into the  hysterectomy bed to ensure continued proper hemostasis. The robot was  then undocked. Dr. Mary Beth Borden then came in to do her umbilical hernia repair,  and I scrubbed out.         Bri David M.D.    D: 04/08/2021 12:55:22       T: 04/08/2021 16:45:56     [email or web address]  Job#: [id number]     Doc#: [id number]    CC:

## 2021-04-09 NOTE — TELEPHONE ENCOUNTER
S/p 4/8 Robotically assisted total laparoscopic  hysterectomy with bilateral salpingo-oophorectomy. Pt states that she is doing good this morning. States that she is using the bathroom with no complications. Pt states incisions are clean, dry and intact. Pt denies any s/sx of infection, n/v, fevers, or chills. Pt advised to call the office with any questions or concerns.

## 2021-04-09 NOTE — OP NOTE
[address] [id number]                                OPERATIVE REPORT    PATIENT NAME: Le Hitchcock              :        1965  MED REC NO:   [id number]                           ROOM:  ACCOUNT NO:   [de-identified]                           ADMIT DATE: 2021  PROVIDER:     Emilia Qureshi M.D.    [place]:  2021    PREOPERATIVE DIAGNOSIS:  Incarcerated incisional hernia. POSTOPERATIVE DIAGNOSIS:  Incarcerated incisional hernia. PROCEDURE:  Open repair of incarcerated incisional hernia with small  Ventralex mesh with strap. SURGEON:  Emilia Qureshi MD    ANESTHESIA:  General.    Additional procedures per Dr. Angel Gross. ESTIMATED BLOOD LOSS:  Less than 5 mL. FINDINGS:  Incarcerated preperitoneal fat. SPECIMENS:  None. DESCRIPTION OF PROCEDURE:  After Dr. Yandel Kan performed his  robotic-assisted laparoscopic hysterectomy, I entered the operating  suite. Discussed with the patient open repair of her incisional hernia. She had had a remote laparoscopic cholecystectomy, had a bulge at the  camera port site incision. She wanted to proceed with combined  procedure or repair with Dr. Yandel Kan. Incision was made below the  umbilicus. There was some incarcerated preperitoneal fat, which was  excised. This exposed the fingertip fascial defect. The patient  strongly desired mesh placement. A small Ventralex Qawalangin patch was  placed in an underlay fashion, secured circumferentially with  interrupted 0 Prolene suture. The fascia was then closed over top of  the mesh with interrupted 0 Ethibond suture. Subcutaneous tissues were  closed with interrupted 4-0 Vicryl suture and skin was closed with  running subcuticular 4-0 Vicryl suture. Skin glue was applied. Additional incisions were then closed per RNFLUCIA. See also Dr. Mariana Zacarias dictation.         Lila Vega M.D.    D:

## 2021-04-22 ENCOUNTER — OFFICE VISIT (OUTPATIENT)
Dept: SURGERY | Age: 56
End: 2021-04-22

## 2021-04-22 VITALS
TEMPERATURE: 97.8 F | DIASTOLIC BLOOD PRESSURE: 80 MMHG | OXYGEN SATURATION: 98 % | RESPIRATION RATE: 18 BRPM | HEART RATE: 56 BPM | SYSTOLIC BLOOD PRESSURE: 120 MMHG | HEIGHT: 68 IN | WEIGHT: 182.3 LBS | BODY MASS INDEX: 27.63 KG/M2

## 2021-04-22 DIAGNOSIS — N92.1 MENORRHAGIA WITH IRREGULAR CYCLE: ICD-10-CM

## 2021-04-22 DIAGNOSIS — Z09 POSTOP CHECK: ICD-10-CM

## 2021-04-22 DIAGNOSIS — K43.0 INCARCERATED INCISIONAL HERNIA: Primary | ICD-10-CM

## 2021-04-22 PROCEDURE — 99024 POSTOP FOLLOW-UP VISIT: CPT | Performed by: SURGERY

## 2021-04-22 NOTE — PROGRESS NOTES
[Penicillins]     Biaxin [Clarithromycin] Rash    Norco [Hydrocodone-Acetaminophen] Other (See Comments)     Diaphoretic, flushed    Vancomycin Itching and Rash       Review of Systems  History obtained from the patient. Constitutional: Denies any fever, chills, fatigue. Wound: Denies any rash, skin color changes or wound problems. Resp: Denies any cough, shortness of breath. CV: Denies any chest pain, orthopnea or syncope. GI: Positive for incisional discomfort only. Denies any nausea, vomiting, blood in the stool, constipation or diarrhea. : Denies any hematuria, hesitancy or dysuria. OBJECTIVE     VITALS: /80 (Site: Right Upper Arm, Position: Sitting, Cuff Size: Medium Adult)   Pulse 56   Temp 97.8 °F (36.6 °C) (Temporal)   Resp 18   Ht 5' 8\" (1.727 m)   Wt 182 lb 4.8 oz (82.7 kg)   SpO2 98%   BMI 27.72 kg/m²     CONSTITUTIONAL: Alert and oriented times 3, no acute distress and cooperative to examination. SKIN: Skin color, texture, turgor normal. No rashes or lesions. INCISION: wound margins intact and healing well. No signs of infection. No drainage. LUNGS: Lungs Clear  CARDIOVASCULAR: Normal Rate  ABDOMEN: soft, nontender, nondistended, no masses . Hernia repair intact.   NEUROLOGIC: No sensory or motor nerve irritation      Performed by: Marleni Flores. Pathology     [facility]            [id number]   Assoc.                                              Page 1 of 1   [facility]   [address] [facility], [address] [address]                                                       PROC: 2021   [facility]                                    RECV: 2021   [address]                                    RPTD: 2021   [address] [facility], [address] [address]                       MRN:  [id number]     LOC: ASOR                       ACCT: [de-identified]  SEX: F                       : 1965  AGE: 55 Y                          PATHOLOGY REPORT                       ATTN: Mango Norris                     REQ: MELINA ISSA       Copies To:   Cheryl Kendrick       Clinical Information: POST MENOPAUSAL BLEEDING, UMBILICAL HERNIA     FINAL DIAGNOSIS:   Uterus with bilateral fallopian tubes and ovaries, resection:    Cervix:     No significant pathologic findings.     Negative for dysplasia and malignancy.    Endometrium and myometrium:     Disordered proliferative endometrium.     Leiomyomas.     Negative for hyperplasia and malignancy.    Bilateral fallopian tubes and ovaries:     Paratubal cyst, left fallopian tube.     Follicular cyst, right ovary.      Specimen:   UTERUS, BILATERAL TUBES AND OVARIES       Gross Examination:   The container is labeled Gwen Garcias, uterus, bilateral tubes   and ovaries.  Received in formalin is a 189-gram uterus with attached   bilateral adnexal structures.  The uterus measures 10 cm from the   external os to the top of the fundus, 5.5 cm from cornu to cornu, and 5   cm from anterior to posterior fundus.  The serosal surface is tan.  No   adhesions or implants. Sydelle Piggs is a single white pedunculated nodule   measuring 1 cm.  Sections through the myometrium reveal a white whorled   cut surface.  The exocervix is tan, smooth, and glistening.  No   discrete lesions.  The endocervix is patent with a normal trabeculated   appearance.  The endometrial cavity measures 4.5 x 2.5 cm and the   endometrium is red-tan and velvety.  Sections through the myometrium   reveal a few small white whorled myometrial nodules.  The largest   myometrial nodule measures 1.5 cm.  No hemorrhage or necrosis.  The   right ovary measures 2.5 x 1.8 x 1.4 cm and has a tan lightly wrinkled   surface.  Sections through the ovary reveal a hemorrhagic cyst   measuring about 1 cm.  The inner cyst wall is smooth.  There are no   masses or papillary excrescences.  The right fallopian tube is intact   and complete, and measures 5.5 cm in length x about 0.6 cm in average   diameter.  No discrete lesions.  The left ovary measures 2.7 x 1.5 x   about 1 cm and has a tan wrinkled surface.  Sections through the ovary   reveal a tan cut surface.  No discrete lesions.  The left fallopian   tube is intact and complete, and measures about 5 cm in length x 0.5 cm   in diameter.  There is a paratubal cyst on the stalk.  Sections of the   right ovary demonstrate a follicular cyst.  Malignancy and neoplasia   are not seen.  The right fallopian tube the right fallopian tube show   no evidence of tubal dysplasia or neoplasia.  Sections of the left   ovary are unremarkable. Gayathri Cobos is no evidence of tubal dysplasia or   neoplasia within the left ovary.  A benign paratubal cysts present.  No   additional lesions.  Representative sections are submitted.  Cassette   #1 - cervix; cassette #2 - anterior uterine wall and myometrial   nodules; cassette #3 - posterior uterine wall and myometrial nodules;   cassette #4 - largest myometrial nodule and serosal nodule; cassette #5   - right fallopian tube and ovary; and cassette #6 - left fallopian tube   and ovary and cyst.  ss.  EKM/DKR:[id number]     Microscopic Examination:   Microscopic examination of the cervix demonstrates no evidence of   squamous or glandular dysplasia.  Neoplasia is not seen.  Sections of   the endometrium demonstrate disordered proliferative endometrium. Leiomyomas are seen within the underlying myometrium.  There is no   evidence of significant atypia, mitotic activity, or necrosis. [id number]                                                     <Sign Out Dr. Isak Brown M.D., F.C.A.P.        NV[facility]/ [address] [address]  Printed on:  4/12/2021   Caitlin Kaye [address]   [address] [facility]   Original print date: 04/12/2021

## 2021-06-11 ENCOUNTER — HOSPITAL ENCOUNTER (OUTPATIENT)
Dept: WOMENS IMAGING | Age: 56
Discharge: HOME | End: 2021-06-11
Payer: COMMERCIAL

## 2021-06-11 DIAGNOSIS — C50.411 PRIMARY MALIGNANT NEOPLASM OF UPPER OUTER QUADRANT OF FEMALE BREAST, RIGHT (HCC): ICD-10-CM

## 2021-06-11 DIAGNOSIS — R92.8 ABNORMAL MRI, BREAST: ICD-10-CM

## 2021-06-11 DIAGNOSIS — Z09 FOLLOW UP: ICD-10-CM

## 2021-06-11 PROCEDURE — 76642 ULTRASOUND BREAST LIMITED: CPT

## 2021-09-22 ENCOUNTER — HOSPITAL ENCOUNTER (OUTPATIENT)
Dept: WOMENS IMAGING | Age: 56
Discharge: HOME | End: 2021-09-22
Payer: COMMERCIAL

## 2021-09-22 DIAGNOSIS — R59.9 LYMPH NODE ENLARGEMENT: ICD-10-CM

## 2021-09-22 DIAGNOSIS — R92.2 BREAST DENSITY: ICD-10-CM

## 2021-09-22 DIAGNOSIS — Z09 FOLLOW UP: ICD-10-CM

## 2021-09-22 PROCEDURE — 76642 ULTRASOUND BREAST LIMITED: CPT

## 2021-10-01 ENCOUNTER — HOSPITAL ENCOUNTER (OUTPATIENT)
Dept: WOMENS IMAGING | Age: 56
Discharge: HOME | End: 2021-10-01
Payer: COMMERCIAL

## 2021-10-01 DIAGNOSIS — R59.0 ENLARGED LYMPH NODES IN ARMPIT: ICD-10-CM

## 2021-10-01 DIAGNOSIS — R59.9 LYMPH NODE ENLARGEMENT: ICD-10-CM

## 2022-04-14 ENCOUNTER — NURSE ONLY (OUTPATIENT)
Dept: LAB | Age: 57
End: 2022-04-14

## 2022-04-14 LAB
ALBUMIN SERPL-MCNC: 4.9 G/DL (ref 3.5–5.1)
ALP BLD-CCNC: 119 U/L (ref 38–126)
ALT SERPL-CCNC: 61 U/L (ref 11–66)
ANION GAP SERPL CALCULATED.3IONS-SCNC: 12 MEQ/L (ref 8–16)
AST SERPL-CCNC: 30 U/L (ref 5–40)
BASOPHILS # BLD: 1 %
BASOPHILS ABSOLUTE: 0.1 THOU/MM3 (ref 0–0.1)
BILIRUB SERPL-MCNC: 0.5 MG/DL (ref 0.3–1.2)
BUN BLDV-MCNC: 17 MG/DL (ref 7–22)
CALCIUM SERPL-MCNC: 9.7 MG/DL (ref 8.5–10.5)
CHLORIDE BLD-SCNC: 100 MEQ/L (ref 98–111)
CO2: 25 MEQ/L (ref 23–33)
CREAT SERPL-MCNC: 0.9 MG/DL (ref 0.4–1.2)
EOSINOPHIL # BLD: 2.2 %
EOSINOPHILS ABSOLUTE: 0.2 THOU/MM3 (ref 0–0.4)
ERYTHROCYTE [DISTWIDTH] IN BLOOD BY AUTOMATED COUNT: 13.6 % (ref 11.5–14.5)
ERYTHROCYTE [DISTWIDTH] IN BLOOD BY AUTOMATED COUNT: 42.6 FL (ref 35–45)
GFR SERPL CREATININE-BSD FRML MDRD: 65 ML/MIN/1.73M2
GLUCOSE BLD-MCNC: 82 MG/DL (ref 70–108)
HCT VFR BLD CALC: 41 % (ref 37–47)
HEMOGLOBIN: 13.1 GM/DL (ref 12–16)
IMMATURE GRANS (ABS): 0.01 THOU/MM3 (ref 0–0.07)
IMMATURE GRANULOCYTES: 0.1 %
LYMPHOCYTES # BLD: 31.1 %
LYMPHOCYTES ABSOLUTE: 2.1 THOU/MM3 (ref 1–4.8)
MCH RBC QN AUTO: 27.8 PG (ref 26–33)
MCHC RBC AUTO-ENTMCNC: 32 GM/DL (ref 32.2–35.5)
MCV RBC AUTO: 86.9 FL (ref 81–99)
MONOCYTES # BLD: 11 %
MONOCYTES ABSOLUTE: 0.8 THOU/MM3 (ref 0.4–1.3)
NUCLEATED RED BLOOD CELLS: 0 /100 WBC
PLATELET # BLD: 326 THOU/MM3 (ref 130–400)
PMV BLD AUTO: 10.2 FL (ref 9.4–12.4)
POTASSIUM SERPL-SCNC: 4.1 MEQ/L (ref 3.5–5.2)
RBC # BLD: 4.72 MILL/MM3 (ref 4.2–5.4)
SEG NEUTROPHILS: 54.6 %
SEGMENTED NEUTROPHILS ABSOLUTE COUNT: 3.8 THOU/MM3 (ref 1.8–7.7)
SODIUM BLD-SCNC: 137 MEQ/L (ref 135–145)
T4 FREE: 1.41 NG/DL (ref 0.93–1.76)
TOTAL PROTEIN: 7.7 G/DL (ref 6.1–8)
TSH SERPL DL<=0.05 MIU/L-ACNC: 1.36 UIU/ML (ref 0.4–4.2)
WBC # BLD: 6.9 THOU/MM3 (ref 4.8–10.8)

## 2022-04-15 LAB — CA 27-29: 11 U/ML (ref 0–38)

## 2022-06-06 ENCOUNTER — HOSPITAL ENCOUNTER (OUTPATIENT)
Dept: ULTRASOUND IMAGING | Age: 57
Discharge: HOME | End: 2022-06-06
Payer: COMMERCIAL

## 2022-06-06 DIAGNOSIS — E06.3 HASHIMOTO'S THYROIDITIS: ICD-10-CM

## 2022-06-06 DIAGNOSIS — E04.9 GOITER: ICD-10-CM

## 2022-06-06 PROCEDURE — 76536 US EXAM OF HEAD AND NECK: CPT

## 2022-09-22 ENCOUNTER — NURSE ONLY (OUTPATIENT)
Dept: LAB | Age: 57
End: 2022-09-22

## 2022-09-23 LAB
ORGANISM: ABNORMAL
URINE CULTURE, ROUTINE: ABNORMAL

## 2022-11-28 ENCOUNTER — OFFICE VISIT (OUTPATIENT)
Dept: SURGERY | Age: 57
End: 2022-11-28
Payer: COMMERCIAL

## 2022-11-28 VITALS
HEART RATE: 110 BPM | BODY MASS INDEX: 28.2 KG/M2 | OXYGEN SATURATION: 98 % | RESPIRATION RATE: 20 BRPM | WEIGHT: 186.1 LBS | DIASTOLIC BLOOD PRESSURE: 62 MMHG | TEMPERATURE: 97 F | HEIGHT: 68 IN | SYSTOLIC BLOOD PRESSURE: 128 MMHG

## 2022-11-28 DIAGNOSIS — L03.316 CELLULITIS OF UMBILICUS: Primary | ICD-10-CM

## 2022-11-28 PROCEDURE — 99212 OFFICE O/P EST SF 10 MIN: CPT | Performed by: SURGERY

## 2022-11-28 RX ORDER — SULFAMETHOXAZOLE AND TRIMETHOPRIM 800; 160 MG/1; MG/1
1 TABLET ORAL 2 TIMES DAILY
Qty: 20 TABLET | Refills: 0 | Status: SHIPPED | OUTPATIENT
Start: 2022-11-28 | End: 2022-12-08

## 2022-11-28 NOTE — PROGRESS NOTES
Astrid Souza MD   General Surgery  New Patient Evaluation in Office  Pt Name: Justine Valenzuela  Date of Birth 1965   Today's Date: 11/28/2022  Medical Record Number: [id number]  Primary Care Provider: Alisia Desai MD  Chief Complaint:  Chief Complaint   Patient presents with    Surgical Consult     S/P incarcerated incisional hernia repair 4/8/21 - Redness over umbilical area, warm to touch and tender       ASSESSMENT      1. Cellulitis of umbilicus    2. History of umbilical hernia repair with mesh  3. History of breast cancer remotely  PLANS      Start oral Bactrim  Ultrasound abdominal wall evaluate for recurrent hernia  If ultrasound unrevealing consider CT scan of the abdomen and pelvis. 4.  Follow-up office 1 week      Nelly Vaughan is a 64y.o. year old female who is presenting today in the office for evaluation of periumbilical pain and some redness. Brian Mack is well-known to me. She was previously treated by myself for breast cancer in 2015. She underwent bilateral mastectomy at that time with insertion of tissue expanders. She has had no evidence of recurrent disease. She underwent a hysterectomy and had an incarcerated incisional hernia near the umbilicus. She underwent open repair at the time of her laparoscopic hysterectomy with small mesh placed. She been having no issues. Recently she noted more of a bulge in the area and some erythema and tenderness of the anterior abdominal wall. She has had no fever, chills or change in bowel habits. There is an area of erythema around the umbilicus which is very faint around 4 x 7 cm. There is mild tenderness at the umbilicus itself there is no obvious recurrence of hernia but there is some soft tissue fullness just below the umbilicus it is not reducible. This is only about 1 cm in size. She denies nausea or emesis.     Past Medical History  Past Medical History:   Diagnosis Date    Breast cancer ([address] 1    age 52 Cancer ([facility] [address] 2015, jan.    breast, left    Hypertension     Kidney stone     Thyroid disease        Past Surgical History  Past Surgical History:   Procedure Laterality Date    BREAST BIOPSY Right 2013    benign hyperplasia    BREAST BIOPSY Left 2015    invasive ductal carcinoma    BREAST RECONSTRUCTION  6/30/15    replace right tissue expander, revise left breast scar    BREAST SURGERY  02/2015    bilateral mastectomy - left breast cancer    CHOLECYSTECTOMY  1990    laparoscopic    COLONOSCOPY      last one 2013    COSMETIC SURGERY  10/2015    bilateral breast reconstruction    [address] [facility]    left foot spur    HYSTERECTOMY (CERVIX STATUS UNKNOWN) N/A 4/8/2021    ROBOTIC HYSTERECTOMY WITH BSO performed by eMna Gutierrez MD at [address] [facility] Bilateral 2/11/15    BILATERAL MASTECTOMIES WITH LEFT SENTINEL NODE AND BILATERAL RECONSTRUCTION WITH DERMAL AND TISSUE EXPANDERS    OVARY REMOVAL Bilateral 2021    TONSILLECTOMY      adnoids as child    UMBILICAL HERNIA REPAIR N/A 4/3/5217    OPEN UMBILICAL HERNIA REPAIR WITH MESH performed by Katiana Allison MD at [facility] OR       Medications  Current Outpatient Medications   Medication Sig Dispense Refill    Methylsulfonylmethane (MSM PO) Take by mouth daily      sulfamethoxazole-trimethoprim (BACTRIM DS;SEPTRA DS) 800-160 MG per tablet Take 1 tablet by mouth 2 times daily for 10 days 20 tablet 0    Ascorbic Acid (VITAMIN C) 250 MG tablet Take 250 mg by mouth daily      OLIVE LEAF PO Take by mouth 2 in the am and 2 in the evening      Zinc Sulfate (ZINC 15 PO) Take by mouth      lisinopril (PRINIVIL;ZESTRIL) 10 MG tablet Take 10 mg by mouth daily Take 2 tabs daily      loratadine (CLARITIN) 10 MG tablet Take 10 mg by mouth as needed      levothyroxine (SYNTHROID) 100 MCG tablet Take 100 mcg by mouth Daily.       calcium citrate-vitamin D (CITRICAL + D) 315-250 MG-UNIT TABS Take  by mouth 2 times daily. No current facility-administered medications for this visit. Allergies     Allergies   Allergen Reactions    Augmentin [Amoxicillin-Pot Clavulanate] Hives, Diarrhea and Swelling    Hydantoins     Other      Sodium pentathol  Makes me a \"crazy person\"  Steri strips caused redness and rash  bandaids - rash    Pcn [Penicillins]     Biaxin [Clarithromycin] Rash    Norco [Hydrocodone-Acetaminophen] Other (See Comments)     Diaphoretic, flushed    Vancomycin Itching and Rash       Family History  Family History   Problem Relation Age of Onset    Breast Cancer Maternal Aunt 36        age 42's at onset    Heart Disease Mother     Thyroid Cancer Mother 21    Heart Disease Father     Asthma Maternal Grandmother     Heart Disease Paternal Grandfather     Heart Attack Maternal Grandfather     Breast Cancer Paternal Aunt         late 62s    Ovarian Cancer Neg Hx     Diabetes Neg Hx     Kidney Disease Neg Hx     Stroke Neg Hx        SocialHistory  Social History     Socioeconomic History    Marital status:      Spouse name: Not on file    Number of children: Not on file    Years of education: Not on file    Highest education level: Not on file   Occupational History    Not on file   Tobacco Use    Smoking status: Never    Smokeless tobacco: Never   Substance and Sexual Activity    Alcohol use: Yes     Comment: social    Drug use: No    Sexual activity: Not on file   Other Topics Concern    Not on file   Social History Narrative    Not on file     Social Determinants of Health     Financial Resource Strain: Not on file   Food Insecurity: Not on file   Transportation Needs: Not on file   Physical Activity: Not on file   Stress: Not on file   Social Connections: Not on file   Intimate Partner Violence: Not on file   Housing Stability: Not on file           Review of Systems  Review of Systems   Constitutional:  Negative for activity change, chills, fatigue, fever and unexpected weight change.    HENT: Negative for congestion, sore throat, trouble swallowing and voice change. Eyes:  Negative for visual disturbance. Respiratory:  Negative for cough, shortness of breath and wheezing. Cardiovascular:  Negative for chest pain and palpitations. Gastrointestinal:  Positive for abdominal pain. Negative for blood in stool, nausea and vomiting. Swelling and some tenderness at umbilicus   Endocrine: Negative for cold intolerance, heat intolerance and polydipsia. Genitourinary:  Negative for dysuria, flank pain and hematuria. Musculoskeletal:  Negative for gait problem, joint swelling and myalgias. Skin:  Positive for color change. Negative for rash. Periumbilical faint redness   Allergic/Immunologic: Negative for immunocompromised state. Neurological:  Negative for dizziness, tremors, seizures and speech difficulty. Hematological:  Does not bruise/bleed easily. Psychiatric/Behavioral:  Negative for behavioral problems, confusion and suicidal ideas. OBJECTIVE     /62 (Site: Right Upper Arm, Position: Sitting, Cuff Size: Medium Adult)   Pulse (!) 110   Temp 97 °F (36.1 °C) (Temporal)   Resp 20   Ht 5' 8\" (1.727 m)   Wt 186 lb 1.6 oz (84.4 kg)   LMP 06/12/2016   SpO2 98%   BMI 28.30 kg/m²      Physical Exam  Vitals reviewed. Constitutional:       Appearance: Normal appearance. She is well-developed. She is not ill-appearing or diaphoretic. HENT:      Head: Normocephalic and atraumatic. Nose: No congestion. Eyes:      General: No scleral icterus. Extraocular Movements: Extraocular movements intact. Neck:      Thyroid: No thyromegaly. Vascular: No JVD. Trachea: No tracheal deviation. Cardiovascular:      Rate and Rhythm: Normal rate. Pulmonary:      Effort: Pulmonary effort is normal. No respiratory distress. Breath sounds: Normal breath sounds. Abdominal:      General: There is no distension. Palpations: Abdomen is soft.  There is no mass. Tenderness: There is abdominal tenderness. There is no guarding or rebound. Hernia: No hernia is present. Comments: Tenderness at umbilicus with faint fullness. Faint erythema mostly inferior to the umbilicus about 4 x 7 cm   Musculoskeletal:         General: No tenderness or deformity. Cervical back: Neck supple. No rigidity. Lymphadenopathy:      Cervical: No cervical adenopathy. Skin:     General: Skin is warm and dry. Coloration: Skin is not jaundiced or pale. Findings: Erythema present. No rash. Comments: Focal faint erythema periumbilical   Neurological:      General: No focal deficit present. Mental Status: She is alert and oriented to person, place, and time. Cranial Nerves: No cranial nerve deficit.    Psychiatric:         Mood and Affect: Mood normal.         Behavior: Behavior normal.       Lab Results   Component Value Date    WBC 6.9 04/14/2022    HGB 13.1 04/14/2022    HCT 41.0 04/14/2022     04/14/2022    ALT 61 04/14/2022    AST 30 04/14/2022     04/14/2022    K 4.1 04/14/2022     04/14/2022    CREATININE 0.9 04/14/2022    BUN 17 04/14/2022    CO2 25 04/14/2022    TSH 1.360 04/14/2022

## 2022-11-30 ASSESSMENT — ENCOUNTER SYMPTOMS
BLOOD IN STOOL: 0
VOICE CHANGE: 0
SORE THROAT: 0
COUGH: 0
VOMITING: 0
WHEEZING: 0
TROUBLE SWALLOWING: 0
COLOR CHANGE: 1
SHORTNESS OF BREATH: 0
ABDOMINAL PAIN: 1
NAUSEA: 0

## 2022-12-01 ENCOUNTER — HOSPITAL ENCOUNTER (OUTPATIENT)
Dept: CT IMAGING | Age: 57
Discharge: HOME | End: 2022-12-01
Payer: COMMERCIAL

## 2022-12-01 ENCOUNTER — TELEPHONE (OUTPATIENT)
Dept: SURGERY | Age: 57
End: 2022-12-01

## 2022-12-01 DIAGNOSIS — K43.2 RECURRENT INCISIONAL HERNIA: ICD-10-CM

## 2022-12-01 DIAGNOSIS — R10.84 GENERALIZED ABDOMINAL PAIN: ICD-10-CM

## 2022-12-01 DIAGNOSIS — L03.316 CELLULITIS OF UMBILICUS: ICD-10-CM

## 2022-12-01 DIAGNOSIS — L03.316 CELLULITIS OF UMBILICUS: Primary | ICD-10-CM

## 2022-12-01 PROCEDURE — 74177 CT ABD & PELVIS W/CONTRAST: CPT

## 2022-12-01 PROCEDURE — 6360000004 HC RX CONTRAST MEDICATION: Performed by: SURGERY

## 2022-12-01 RX ADMIN — IOPAMIDOL 80 ML: 755 INJECTION, SOLUTION INTRAVENOUS at 17:13

## 2022-12-02 ENCOUNTER — NURSE ONLY (OUTPATIENT)
Dept: LAB | Age: 57
End: 2022-12-02

## 2022-12-02 ENCOUNTER — PROCEDURE VISIT (OUTPATIENT)
Dept: SURGERY | Age: 57
End: 2022-12-02

## 2022-12-02 VITALS
HEIGHT: 68 IN | OXYGEN SATURATION: 98 % | TEMPERATURE: 96.3 F | SYSTOLIC BLOOD PRESSURE: 118 MMHG | WEIGHT: 186 LBS | HEART RATE: 72 BPM | BODY MASS INDEX: 28.19 KG/M2 | RESPIRATION RATE: 18 BRPM | DIASTOLIC BLOOD PRESSURE: 60 MMHG

## 2022-12-02 DIAGNOSIS — S31.105A OPEN WOUND OF UMBILICAL REGION, INITIAL ENCOUNTER: ICD-10-CM

## 2022-12-02 DIAGNOSIS — Z01.818 PRE-OP TESTING: ICD-10-CM

## 2022-12-02 DIAGNOSIS — S31.105A OPEN WOUND OF UMBILICAL REGION, INITIAL ENCOUNTER: Primary | ICD-10-CM

## 2022-12-02 DIAGNOSIS — I10 ESSENTIAL HYPERTENSION: ICD-10-CM

## 2022-12-02 LAB
ANION GAP SERPL CALCULATED.3IONS-SCNC: 15 MEQ/L (ref 8–16)
BUN BLDV-MCNC: 13 MG/DL (ref 7–22)
CALCIUM SERPL-MCNC: 10.1 MG/DL (ref 8.5–10.5)
CHLORIDE BLD-SCNC: 98 MEQ/L (ref 98–111)
CO2: 25 MEQ/L (ref 23–33)
CREAT SERPL-MCNC: 0.9 MG/DL (ref 0.4–1.2)
GFR SERPL CREATININE-BSD FRML MDRD: > 60 ML/MIN/1.73M2
GLUCOSE BLD-MCNC: 92 MG/DL (ref 70–108)
HCT VFR BLD CALC: 41 % (ref 37–47)
HEMOGLOBIN: 13.3 GM/DL (ref 12–16)
POTASSIUM SERPL-SCNC: 4.7 MEQ/L (ref 3.5–5.2)
SODIUM BLD-SCNC: 138 MEQ/L (ref 135–145)

## 2022-12-02 NOTE — PROGRESS NOTES
Ambulatory Procedure Note    Patient name: Eduardo Whitten  Medical Record Number: [id number]  Primary Care Physician: Milissa Favre, MD    Date of Procedure: 12/2/2022    Pre-operative Diagnosis: cellulitis abscess at umbilicus    Post-operative Diagnosis: Same    Procedure: Incision and drainage abdominal wall abscess at umbilicus skin subcutaneous fat    Anesthesia: Local     Surgeons/Assistants: Olt    Estimated Blood Loss: 3  ml    Complications: none immediately appreciated    Specimens: Subcutaneous fluid swab for aerobic anaerobic cultures     Procedure: In the office, the pt was placed supine on the procedure table. Consent was given by the patient. The abdominal wall and umbilicus was prepped and draped. The area was infiltrated with 1% lidocaine with epinephrine buffered with sodium bicarb and a total of 6 mL was utilized. After appropriately anesthetized incision was made at the inferior aspect of the umbilicus there was some necrotic subcutaneous fat with some very thin purulent fluid. This was swabbed and sent for culture. It was left open packed with quarter inch plain gauze. It had been irrigated with full-strength hydrogen peroxide. Patient tolerated procedure well she will follow-up for wound check Monday.         Ron Ventura MD

## 2022-12-03 ENCOUNTER — HOSPITAL ENCOUNTER (OUTPATIENT)
Age: 57
Discharge: HOME | End: 2022-12-03
Payer: COMMERCIAL

## 2022-12-03 DIAGNOSIS — S31.105A OPEN WOUND OF UMBILICAL REGION, INITIAL ENCOUNTER: ICD-10-CM

## 2022-12-03 DIAGNOSIS — I10 ESSENTIAL HYPERTENSION: ICD-10-CM

## 2022-12-03 DIAGNOSIS — Z01.818 PRE-OP TESTING: ICD-10-CM

## 2022-12-03 PROCEDURE — 93005 ELECTROCARDIOGRAM TRACING: CPT

## 2022-12-04 LAB
AEROBIC CULTURE: NORMAL
ANAEROBIC CULTURE: NORMAL
EKG ATRIAL RATE: 71 BPM
EKG P AXIS: 68 DEGREES
EKG P-R INTERVAL: 136 MS
EKG Q-T INTERVAL: 398 MS
EKG QRS DURATION: 78 MS
EKG QTC CALCULATION (BAZETT): 432 MS
EKG R AXIS: 48 DEGREES
EKG T AXIS: 61 DEGREES
EKG VENTRICULAR RATE: 71 BPM
GRAM STAIN RESULT: NORMAL

## 2022-12-04 PROCEDURE — 93010 ELECTROCARDIOGRAM REPORT: CPT | Performed by: NUCLEAR MEDICINE

## 2022-12-05 ENCOUNTER — OFFICE VISIT (OUTPATIENT)
Dept: SURGERY | Age: 57
End: 2022-12-05
Payer: COMMERCIAL

## 2022-12-05 VITALS
WEIGHT: 183.3 LBS | OXYGEN SATURATION: 98 % | RESPIRATION RATE: 18 BRPM | SYSTOLIC BLOOD PRESSURE: 128 MMHG | BODY MASS INDEX: 27.78 KG/M2 | HEIGHT: 68 IN | HEART RATE: 84 BPM | TEMPERATURE: 97.7 F | DIASTOLIC BLOOD PRESSURE: 60 MMHG

## 2022-12-05 DIAGNOSIS — S31.105A OPEN WOUND OF UMBILICAL REGION, INITIAL ENCOUNTER: Primary | ICD-10-CM

## 2022-12-05 PROCEDURE — 99214 OFFICE O/P EST MOD 30 MIN: CPT | Performed by: SURGERY

## 2022-12-07 ASSESSMENT — ENCOUNTER SYMPTOMS
COUGH: 0
BLOOD IN STOOL: 0
TROUBLE SWALLOWING: 0
VOICE CHANGE: 0
SORE THROAT: 0
WHEEZING: 0
COLOR CHANGE: 1
ABDOMINAL PAIN: 1
SHORTNESS OF BREATH: 0
VOMITING: 0
NAUSEA: 0

## 2022-12-07 NOTE — PROGRESS NOTES
Juan David Garcia MD   General Surgery  Follow up Patient Evaluation in Office  Pt Name: Winnie Jones  Date of Birth 1965   Today's Date: 12/5/2022  Medical Record Number: [id number]  Primary Care Provider: Niraj Restrepo MD  Chief Complaint:  Chief Complaint   Patient presents with    Follow Up After Procedure     S/p Incision and drainage of umbilicus abscess done in the procedure room-12/2/2022       ASSESSMENT      1. Open wound of umbilical region, initial encounter    2. History of umbilical hernia repair with mesh  3. History of breast cancer remotely  Open wound of umbilicus subsequent encounter abscess status post incision and drainage. No residual purulence. PLANS      Continue oral antibiotic  CT reviewed and I&D performed. No recurrent hernia. Schedule patient for umbilical exploration and debridement likely removal of mesh. 4.  Techniques and risks of surgery discussed with patient. Potential if no mesh involvement may just breed wound and leave mesh in place but would err on the side of mesh removal if possible. Risks include but not limited to further infections recurrent hernia as well as potential for bowel injury if any underlying adhesions. Patient expressed understanding all questions answered. 5.  Preoperative testing per anesthesia guidelines  6. General anesthesia      SUBJECTIVE   History of present illness:  Wyline Cockayne is a 64y.o. year old female who is presenting today in the office for evaluation of periumbilical pain and some redness. Kavita Stephens is well-known to me. She was previously treated by myself for breast cancer in 2015. She underwent bilateral mastectomy at that time with insertion of tissue expanders. She has had no evidence of recurrent disease. She underwent a hysterectomy and had an incarcerated incisional hernia near the umbilicus. She underwent open repair at the time of her laparoscopic hysterectomy with small mesh placed.   She been having no issues. Recently she noted more of a bulge in the area and some erythema and tenderness of the anterior abdominal wall. She has had no fever, chills or change in bowel habits. There is an area of erythema around the umbilicus which is very faint around 4 x 7 cm. There is mild tenderness at the umbilicus itself there is no obvious recurrence of hernia but there is some soft tissue fullness just below the umbilicus it is not reducible. This is only about 1 cm in size. She denies nausea or emesis. Interval history: Samreen Ortiz had more swelling very focally at the umbilical skin with some necrosis of the skin. Last visit she underwent procedure with incision and drainage. Wound is markedly improved she has been having her  irrigated and packed no obvious purulence it is vikki down. I am still concerned about viability of the mesh underneath and recommend wound exploration with possible mesh removal.  She is agreeable.     Past Medical History  Past Medical History:   Diagnosis Date    Breast cancer [facility]) 1    age 52    Cancer ([address] 2015, jan.    breast, left    Hypertension     Kidney stone     Thyroid disease        Past Surgical History  Past Surgical History:   Procedure Laterality Date    BREAST BIOPSY Right 2013    benign hyperplasia    BREAST BIOPSY Left 2015    invasive ductal carcinoma    BREAST RECONSTRUCTION  6/30/15    replace right tissue expander, revise left breast scar    BREAST SURGERY  02/2015    bilateral mastectomy - left breast cancer    CHOLECYSTECTOMY  1990    laparoscopic    COLONOSCOPY      last one 2013    COSMETIC SURGERY  10/2015    bilateral breast reconstruction    [facility]    left foot spur    HYSTERECTOMY (CERVIX STATUS UNKNOWN) N/A 4/8/2021    ROBOTIC HYSTERECTOMY WITH BSO performed by Tammy Dillard MD at [address] [facility] Bilateral 2/11/15    BILATERAL MASTECTOMIES WITH LEFT SENTINEL NODE AND BILATERAL RECONSTRUCTION WITH DERMAL AND TISSUE EXPANDERS    OVARY REMOVAL Bilateral 2021    TONSILLECTOMY      adnoids as child    UMBILICAL HERNIA REPAIR N/A 9/1/0855    OPEN UMBILICAL HERNIA REPAIR WITH MESH performed by Samantha Buckley MD at [facility] OR       Medications  Current Outpatient Medications   Medication Sig Dispense Refill    Methylsulfonylmethane (MSM PO) Take by mouth daily      sulfamethoxazole-trimethoprim (BACTRIM DS;SEPTRA DS) 800-160 MG per tablet Take 1 tablet by mouth 2 times daily for 10 days 20 tablet 0    Ascorbic Acid (VITAMIN C) 250 MG tablet Take 250 mg by mouth daily      OLIVE LEAF PO Take by mouth 2 in the am and 2 in the evening      Zinc Sulfate (ZINC 15 PO) Take by mouth      lisinopril (PRINIVIL;ZESTRIL) 10 MG tablet Take 10 mg by mouth daily Take 2 tabs daily      loratadine (CLARITIN) 10 MG tablet Take 10 mg by mouth as needed      levothyroxine (SYNTHROID) 100 MCG tablet Take 100 mcg by mouth Daily. calcium citrate-vitamin D (CITRICAL + D) 315-250 MG-UNIT TABS Take  by mouth 2 times daily. No current facility-administered medications for this visit.      Allergies     Allergies   Allergen Reactions    Augmentin [Amoxicillin-Pot Clavulanate] Hives, Diarrhea and Swelling    Hydantoins     Other      Sodium pentathol  Makes me a \"crazy person\"  Steri strips caused redness and rash  bandaids - rash    Pcn [Penicillins]     Biaxin [Clarithromycin] Rash    Norco [Hydrocodone-Acetaminophen] Other (See Comments)     Diaphoretic, flushed    Vancomycin Itching and Rash       Family History  Family History   Problem Relation Age of Onset    Breast Cancer Maternal Aunt 36        age 42's at onset    Heart Disease Mother     Thyroid Cancer Mother 21    Heart Disease Father     Asthma Maternal Grandmother     Heart Disease Paternal Grandfather     Heart Attack Maternal Grandfather     Breast Cancer Paternal Aunt         late 62s    Ovarian Cancer Neg Hx     Diabetes Neg Hx     Kidney Disease Neg Hx     Stroke Neg Hx        SocialHistory  Social History     Socioeconomic History    Marital status:      Spouse name: Not on file    Number of children: Not on file    Years of education: Not on file    Highest education level: Not on file   Occupational History    Not on file   Tobacco Use    Smoking status: Never    Smokeless tobacco: Never   Substance and Sexual Activity    Alcohol use: Yes     Comment: social    Drug use: No    Sexual activity: Not on file   Other Topics Concern    Not on file   Social History Narrative    Not on file     Social Determinants of Health     Financial Resource Strain: Not on file   Food Insecurity: Not on file   Transportation Needs: Not on file   Physical Activity: Not on file   Stress: Not on file   Social Connections: Not on file   Intimate Partner Violence: Not on file   Housing Stability: Not on file           Review of Systems  Review of Systems   Constitutional:  Negative for activity change, chills, fatigue, fever and unexpected weight change. HENT:  Negative for congestion, sore throat, trouble swallowing and voice change. Eyes:  Negative for visual disturbance. Respiratory:  Negative for cough, shortness of breath and wheezing. Cardiovascular:  Negative for chest pain and palpitations. Gastrointestinal:  Positive for abdominal pain. Negative for blood in stool, nausea and vomiting. Some tenderness around the umbilicus. Endocrine: Negative for cold intolerance, heat intolerance and polydipsia. Genitourinary:  Negative for dysuria, flank pain and hematuria. Musculoskeletal:  Negative for gait problem, joint swelling and myalgias. Skin:  Positive for color change. Negative for rash. Periumbilical redness has resolved. Open wound at the umbilicus. Some superficial skin necrosis/slough   Allergic/Immunologic: Negative for immunocompromised state.    Neurological:  Negative for dizziness, tremors, seizures and speech difficulty. Hematological:  Negative for adenopathy. Does not bruise/bleed easily. Psychiatric/Behavioral:  Negative for behavioral problems and confusion. OBJECTIVE     /60 (Site: Right Upper Arm, Position: Sitting, Cuff Size: Medium Adult)   Pulse 84   Temp 97.7 °F (36.5 °C) (Temporal)   Resp 18   Ht 5' 8\" (1.727 m)   Wt 183 lb 4.8 oz (83.1 kg)   LMP 06/12/2016   SpO2 98%   BMI 27.87 kg/m²      Physical Exam  Vitals reviewed. Constitutional:       Appearance: Normal appearance. She is well-developed. She is not ill-appearing or diaphoretic. HENT:      Head: Normocephalic and atraumatic. Nose: No congestion. Eyes:      General: No scleral icterus. Extraocular Movements: Extraocular movements intact. Neck:      Thyroid: No thyromegaly. Vascular: No JVD. Trachea: No tracheal deviation. Cardiovascular:      Rate and Rhythm: Normal rate. Pulmonary:      Effort: Pulmonary effort is normal. No respiratory distress. Breath sounds: Normal breath sounds. Abdominal:      General: There is no distension. Palpations: Abdomen is soft. There is no mass. Tenderness: There is abdominal tenderness. There is no guarding or rebound. Hernia: No hernia is present. Comments: Tenderness at umbilicus with faint fullness. No palpable fluctuance. Incision and drainage site vikki down but still small area open. Musculoskeletal:         General: No tenderness or deformity. Cervical back: Neck supple. No rigidity. Lymphadenopathy:      Cervical: No cervical adenopathy. Skin:     General: Skin is warm and dry. Coloration: Skin is not jaundiced or pale. Findings: No erythema or rash. Comments: Periumbilical erythema has resolved   Neurological:      General: No focal deficit present. Mental Status: She is alert and oriented to person, place, and time. Cranial Nerves: No cranial nerve deficit. Psychiatric:         Mood and Affect: Mood normal.         Behavior: Behavior normal.       Lab Results   Component Value Date    WBC 6.9 04/14/2022    HGB 13.3 12/02/2022    HCT 41.0 12/02/2022     04/14/2022    ALT 61 04/14/2022    AST 30 04/14/2022     12/02/2022    K 4.7 12/02/2022    CL 98 12/02/2022    CREATININE 0.9 12/02/2022    BUN 13 12/02/2022    CO2 25 12/02/2022    TSH 1.360 04/14/2022         CT ABDOMEN PELVIS W IV CONTRAST Additional Contrast? Oral [[id number]]  Status: Final result     Order Providers    Authorizing Encounter Billing   Tessy Read MD STR CT IMAGING RM1 Travis Metcalf MD            Signed by    Signed Date/Time Phone Pager   Shelia Medina 12/01/2022  5:58 [phone]      Reading Providers    Read Date Phone Pager   Shelia Medina u Dec 1, 2022  5:58 -[phone]        All Reviewers List    Tessy Read MD on 12/2/2022 05:10             CT ABDOMEN PELVIS W IV CONTRAST Additional Contrast? Oral: Patient Communication     Add Comments   Seen         Routing History    Priority Sent On From To Message Type    12/1/2022  5:58 PM Edi, Jody Koyanagi Incoming Radiant Results From Yolanda Griffith MD Results               Radiation Dose Estimates    No radiation information found for this patient            Narrative   CT of the abdomen and pelvis after administration of IV contrast.       Technique: CT from the lung bases through the ischial tuberosities after    administration of IV contrast       Comparison:   None       Findings: Normal lung bases. Fatty infiltration of the liver. No masses. Status post cholecystectomy. Normal appearing adrenal glands. Normal spleen. No masses. The spleen appears normal in size. Normal kidneys. No renal mass. No hydronephrosis. Normal bowel. No pneumoperitoneum or abscess. No bowel obstruction. Normal pancreas. No pancreatitis. Normal retroperitoneal structures. No adenopathy. Normal caliber abdominal    aorta. Unremarkable urinary bladder. Normal bones. Question complex fluid collection involving the umbilicus measuring 11 mm. This could represent small abscess. Consider follow-up ultrasound for    further investigation. No definite hernia. Impression   Impression:   Question complex fluid collection involving the umbilicus. This could    represent small abscess. Consider follow-up ultrasound for further    investigation. This document has been electronically signed by: Eagle Saxena MD on    12/01/2022 05:58 PM       All CTs at this facility use dose modulation techniques and iterative    reconstructions, and/or weight-based dosing   when appropriate to reduce radiation to a low as reasonably achievable.          Imaging reviewed

## 2022-12-08 ENCOUNTER — ANESTHESIA EVENT (OUTPATIENT)
Dept: OPERATING ROOM | Age: 57
End: 2022-12-08
Payer: COMMERCIAL

## 2022-12-08 ENCOUNTER — ANESTHESIA (OUTPATIENT)
Dept: OPERATING ROOM | Age: 57
End: 2022-12-08
Payer: COMMERCIAL

## 2022-12-08 ENCOUNTER — HOSPITAL ENCOUNTER (OUTPATIENT)
Age: 57
Setting detail: OUTPATIENT SURGERY
Discharge: HOME | End: 2022-12-08
Attending: SURGERY | Admitting: SURGERY
Payer: COMMERCIAL

## 2022-12-08 VITALS
HEIGHT: 68 IN | WEIGHT: 179.8 LBS | DIASTOLIC BLOOD PRESSURE: 72 MMHG | HEART RATE: 77 BPM | RESPIRATION RATE: 10 BRPM | BODY MASS INDEX: 27.25 KG/M2 | OXYGEN SATURATION: 97 % | SYSTOLIC BLOOD PRESSURE: 124 MMHG | TEMPERATURE: 97.8 F

## 2022-12-08 DIAGNOSIS — S31.105D OPEN WOUND OF UMBILICAL REGION, SUBSEQUENT ENCOUNTER: Primary | ICD-10-CM

## 2022-12-08 DIAGNOSIS — S31.105A OPEN WOUND OF UMBILICAL REGION, INITIAL ENCOUNTER: ICD-10-CM

## 2022-12-08 PROCEDURE — 6360000002 HC RX W HCPCS: Performed by: NURSE ANESTHETIST, CERTIFIED REGISTERED

## 2022-12-08 PROCEDURE — 2500000003 HC RX 250 WO HCPCS: Performed by: SURGERY

## 2022-12-08 PROCEDURE — 3600000002 HC SURGERY LEVEL 2 BASE: Performed by: SURGERY

## 2022-12-08 PROCEDURE — 7100000000 HC PACU RECOVERY - FIRST 15 MIN: Performed by: SURGERY

## 2022-12-08 PROCEDURE — 3700000000 HC ANESTHESIA ATTENDED CARE: Performed by: SURGERY

## 2022-12-08 PROCEDURE — 7100000011 HC PHASE II RECOVERY - ADDTL 15 MIN: Performed by: SURGERY

## 2022-12-08 PROCEDURE — 7100000001 HC PACU RECOVERY - ADDTL 15 MIN: Performed by: SURGERY

## 2022-12-08 PROCEDURE — 49402 REMOVE FOREIGN BODY ADBOMEN: CPT | Performed by: SURGERY

## 2022-12-08 PROCEDURE — 6370000000 HC RX 637 (ALT 250 FOR IP): Performed by: ANESTHESIOLOGY

## 2022-12-08 PROCEDURE — 2580000003 HC RX 258: Performed by: SURGERY

## 2022-12-08 PROCEDURE — 2709999900 HC NON-CHARGEABLE SUPPLY: Performed by: SURGERY

## 2022-12-08 PROCEDURE — 3600000012 HC SURGERY LEVEL 2 ADDTL 15MIN: Performed by: SURGERY

## 2022-12-08 PROCEDURE — 3700000001 HC ADD 15 MINUTES (ANESTHESIA): Performed by: SURGERY

## 2022-12-08 PROCEDURE — 88300 SURGICAL PATH GROSS: CPT

## 2022-12-08 PROCEDURE — 6360000002 HC RX W HCPCS: Performed by: SURGERY

## 2022-12-08 PROCEDURE — 7100000010 HC PHASE II RECOVERY - FIRST 15 MIN: Performed by: SURGERY

## 2022-12-08 RX ORDER — CLINDAMYCIN PHOSPHATE 900 MG/50ML
900 INJECTION INTRAVENOUS
Status: COMPLETED | OUTPATIENT
Start: 2022-12-08 | End: 2022-12-08

## 2022-12-08 RX ORDER — SODIUM CHLORIDE 9 MG/ML
INJECTION, SOLUTION INTRAVENOUS CONTINUOUS
Status: DISCONTINUED | OUTPATIENT
Start: 2022-12-08 | End: 2022-12-08 | Stop reason: HOSPADM

## 2022-12-08 RX ORDER — MORPHINE SULFATE 2 MG/ML
4 INJECTION, SOLUTION INTRAMUSCULAR; INTRAVENOUS
Status: DISCONTINUED | OUTPATIENT
Start: 2022-12-08 | End: 2022-12-08 | Stop reason: HOSPADM

## 2022-12-08 RX ORDER — SODIUM CHLORIDE 9 MG/ML
INJECTION, SOLUTION INTRAVENOUS PRN
Status: DISCONTINUED | OUTPATIENT
Start: 2022-12-08 | End: 2022-12-08 | Stop reason: HOSPADM

## 2022-12-08 RX ORDER — SODIUM CHLORIDE 0.9 % (FLUSH) 0.9 %
5-40 SYRINGE (ML) INJECTION PRN
Status: DISCONTINUED | OUTPATIENT
Start: 2022-12-08 | End: 2022-12-08 | Stop reason: HOSPADM

## 2022-12-08 RX ORDER — PROPOFOL 10 MG/ML
INJECTION, EMULSION INTRAVENOUS PRN
Status: DISCONTINUED | OUTPATIENT
Start: 2022-12-08 | End: 2022-12-08 | Stop reason: SDUPTHER

## 2022-12-08 RX ORDER — ONDANSETRON 2 MG/ML
INJECTION INTRAMUSCULAR; INTRAVENOUS PRN
Status: DISCONTINUED | OUTPATIENT
Start: 2022-12-08 | End: 2022-12-08 | Stop reason: SDUPTHER

## 2022-12-08 RX ORDER — SODIUM CHLORIDE 0.9 % (FLUSH) 0.9 %
5-40 SYRINGE (ML) INJECTION EVERY 12 HOURS SCHEDULED
Status: DISCONTINUED | OUTPATIENT
Start: 2022-12-08 | End: 2022-12-08 | Stop reason: HOSPADM

## 2022-12-08 RX ORDER — FENTANYL CITRATE 50 UG/ML
INJECTION, SOLUTION INTRAMUSCULAR; INTRAVENOUS PRN
Status: DISCONTINUED | OUTPATIENT
Start: 2022-12-08 | End: 2022-12-08 | Stop reason: SDUPTHER

## 2022-12-08 RX ORDER — ONDANSETRON 4 MG/1
4 TABLET, ORALLY DISINTEGRATING ORAL EVERY 8 HOURS PRN
Status: DISCONTINUED | OUTPATIENT
Start: 2022-12-08 | End: 2022-12-08 | Stop reason: HOSPADM

## 2022-12-08 RX ORDER — BUPIVACAINE HYDROCHLORIDE 2.5 MG/ML
INJECTION, SOLUTION EPIDURAL; INFILTRATION; INTRACAUDAL PRN
Status: DISCONTINUED | OUTPATIENT
Start: 2022-12-08 | End: 2022-12-08 | Stop reason: ALTCHOICE

## 2022-12-08 RX ORDER — TRAMADOL HYDROCHLORIDE 50 MG/1
50 TABLET ORAL EVERY 6 HOURS PRN
Status: DISCONTINUED | OUTPATIENT
Start: 2022-12-08 | End: 2022-12-08 | Stop reason: HOSPADM

## 2022-12-08 RX ORDER — MORPHINE SULFATE 2 MG/ML
2 INJECTION, SOLUTION INTRAMUSCULAR; INTRAVENOUS
Status: DISCONTINUED | OUTPATIENT
Start: 2022-12-08 | End: 2022-12-08 | Stop reason: HOSPADM

## 2022-12-08 RX ORDER — TRAMADOL HYDROCHLORIDE 50 MG/1
50 TABLET ORAL EVERY 4 HOURS PRN
Qty: 18 TABLET | Refills: 0 | Status: SHIPPED | OUTPATIENT
Start: 2022-12-08 | End: 2022-12-11

## 2022-12-08 RX ORDER — KETOROLAC TROMETHAMINE 30 MG/ML
30 INJECTION, SOLUTION INTRAMUSCULAR; INTRAVENOUS EVERY 6 HOURS PRN
Status: DISCONTINUED | OUTPATIENT
Start: 2022-12-08 | End: 2022-12-08 | Stop reason: HOSPADM

## 2022-12-08 RX ORDER — ONDANSETRON 2 MG/ML
4 INJECTION INTRAMUSCULAR; INTRAVENOUS EVERY 6 HOURS PRN
Status: DISCONTINUED | OUTPATIENT
Start: 2022-12-08 | End: 2022-12-08 | Stop reason: HOSPADM

## 2022-12-08 RX ORDER — DEXAMETHASONE SODIUM PHOSPHATE 10 MG/ML
INJECTION, EMULSION INTRAMUSCULAR; INTRAVENOUS PRN
Status: DISCONTINUED | OUTPATIENT
Start: 2022-12-08 | End: 2022-12-08 | Stop reason: SDUPTHER

## 2022-12-08 RX ORDER — SCOLOPAMINE TRANSDERMAL SYSTEM 1 MG/1
1 PATCH, EXTENDED RELEASE TRANSDERMAL ONCE
Status: DISCONTINUED | OUTPATIENT
Start: 2022-12-08 | End: 2022-12-08 | Stop reason: HOSPADM

## 2022-12-08 RX ADMIN — FENTANYL CITRATE 25 MCG: 50 INJECTION, SOLUTION INTRAMUSCULAR; INTRAVENOUS at 11:16

## 2022-12-08 RX ADMIN — KETOROLAC TROMETHAMINE 30 MG: 30 INJECTION, SOLUTION INTRAMUSCULAR; INTRAVENOUS at 12:00

## 2022-12-08 RX ADMIN — SODIUM CHLORIDE: 9 INJECTION, SOLUTION INTRAVENOUS at 11:09

## 2022-12-08 RX ADMIN — FENTANYL CITRATE 25 MCG: 50 INJECTION, SOLUTION INTRAMUSCULAR; INTRAVENOUS at 11:20

## 2022-12-08 RX ADMIN — ONDANSETRON 4 MG: 2 INJECTION INTRAMUSCULAR; INTRAVENOUS at 11:16

## 2022-12-08 RX ADMIN — CLINDAMYCIN IN 5 PERCENT DEXTROSE 900 MG: 18 INJECTION, SOLUTION INTRAVENOUS at 11:14

## 2022-12-08 RX ADMIN — FENTANYL CITRATE 50 MCG: 50 INJECTION, SOLUTION INTRAMUSCULAR; INTRAVENOUS at 11:23

## 2022-12-08 RX ADMIN — PROPOFOL 200 MG: 10 INJECTION, EMULSION INTRAVENOUS at 11:11

## 2022-12-08 RX ADMIN — FENTANYL CITRATE 50 MCG: 50 INJECTION, SOLUTION INTRAMUSCULAR; INTRAVENOUS at 11:51

## 2022-12-08 RX ADMIN — FENTANYL CITRATE 50 MCG: 50 INJECTION, SOLUTION INTRAMUSCULAR; INTRAVENOUS at 11:47

## 2022-12-08 RX ADMIN — DEXAMETHASONE SODIUM PHOSPHATE 10 MG: 10 INJECTION, EMULSION INTRAMUSCULAR; INTRAVENOUS at 11:16

## 2022-12-08 ASSESSMENT — PAIN - FUNCTIONAL ASSESSMENT: PAIN_FUNCTIONAL_ASSESSMENT: 0-10

## 2022-12-08 ASSESSMENT — PAIN DESCRIPTION - DESCRIPTORS: DESCRIPTORS: SHARP

## 2022-12-08 ASSESSMENT — PAIN DESCRIPTION - ORIENTATION: ORIENTATION: MID

## 2022-12-08 ASSESSMENT — PAIN SCALES - GENERAL: PAINLEVEL_OUTOF10: 8

## 2022-12-08 ASSESSMENT — PAIN DESCRIPTION - PAIN TYPE: TYPE: SURGICAL PAIN

## 2022-12-08 ASSESSMENT — PAIN DESCRIPTION - LOCATION: LOCATION: ABDOMEN

## 2022-12-08 NOTE — OP NOTE
[facility]  Operative Report    PATIENT NAME: [facility]  MEDICAL RECORD NO. [id number]  SURGEON: Urmila Iqbal MD   Primary Care Physician: Patricia Martino MD  Date: 12/8/2022, 11:50 AM     PROCEDURE PERFORMED: umbilical exploration, debridement explantation old ventralex mesh  PREOPERATIVE DIAGNOSIS:   Active Hospital Problems    Diagnosis Date Noted    Open wound of umbilical region [I69.772K] 12/08/2022     Priority: Medium      POSTOPERATIVE DIAGNOSIS: Same, path pending  SURGEON:  Urmila Iqbal MD   ANESTHESIA:  General LMA anesthesia and local  ANESTHESIA:  20  mlOF 0.5% Marcaine   ESTIMATED BLOOD LOSS:  <10  ml  SPECIMEN: explanted mesh  COMPLICATIONS:  None; patient tolerated the procedure well. DRAINS: none  DISPOSITION: Recovery Room  CONDITION: stable      Narrative: Indications: Patient is a 66-year-old female who a year and a half ago had an umbilical hernia repair with a small round Saxman mesh with strap at the time of her hysterectomy. She had no issues until about a week ago she has had some redness swelling and pain in the area. Ultimately a subcutaneous abscess developed and this was incised and drained in the office. She presents today for umbilical exploration and likely mesh removal.  She had been placed on oral antibiotics. Procedure: The patient was brought to the operating suite in the surgery center. She was given clindamycin intravenously. She was placed supine on the operating table with pneumatic sequential compression devices in place. She was administered general anesthetic via laryngeal mask airway. The umbilical incision was extended transversely through the old incision medially and laterally. There was some necrotic skin and subcutaneous fat of the umbilicus which was excised about 1 cm².   There was suture material evident and went down to the mesh did not grossly seem to involve the mesh but the sutures went down to the mesh decision was made that the mesh should be removed. This was excised with some fascia. It was sent to pathology for gross analysis only. There was no intra-abdominal adhesions. The fascial edges were cleaned and the wound fascial edges were reapproximated with interrupted Ethibond suture simple and figure-of-eight fashion. Subcutaneous tissues were irrigated with Irrisept. Umbilical skin was tacked back to the abdominal wall with some subcutaneous tissue. Wound was closed in an intermediate fashion with absorbable suture. Skin was closed with running subcuticular 4-0 Monocryl suture. Skin glue was applied. Patient's LMA was removed in the operating suite and she was transported to the recovery area in stable condition.

## 2022-12-08 NOTE — PROGRESS NOTES
1250-  Report received from [employer]. 1255-  Patient doing well. IV removed-- no complications. Bandage applied. Discharge instructions given. Understanding verbalized. 1300-  Patient dressed. 1310-  Abdominal binder placed. Surgical site clean and intact. 1315-  Patient discharged in stable condition with all belongings. This RN walked patient to car.

## 2022-12-08 NOTE — PROGRESS NOTES
1148 Patient to PACU from surgery. Report from [employer] and [employer]. Patient is alert, oriented, appropriate but still drowsy. Vitals assessed, stable on room air. IV infusing with no complications. Incision on abdomen closed with surgical glue. Redness noted on skin around it, per OR nurse this was there previously. SCDs applied. Scopolamine patch noted behind left ear. 1150 Vitals assessed, stable on room air. 1155 Patient resting comfortably in bed with no complications. 1200 Vitals assessed, stable on room air. NSR on monitor. Ice pack given for patient comfort. 1205 Patient resting comfortably In bed. States pain medication is working. 1210 Vitals assessed, stable on room air. NSR on monitor. [id number] Patient meets discharge criteria to move to Phase 2.  and Aunt at bedside, all questions answered at this time. Abdominal binder given to patient and instructed on how to use. 1220 Snack and drink given per patient preference. Call light in reach.    1245 Report given to [facility]

## 2022-12-08 NOTE — DISCHARGE INSTRUCTIONS
DR HUBBARD'S DISCHARGE INSTRUCTIONS    Pt Name: Daniele Tello Record Number: [id number]  Today's Date: 12/8/2022    GENERAL ANESTHESIA OR SEDATION  1. Do not drive or operate hazardous machinery for 24 hours. 2. Do not make important business or personal decisions for 24 hours. 3. Do not drink alcoholic beverages or use tobacco for 24 hours. ACTIVITY INSTRUCTIONS:  [] Rest today. Resume light to normal activity tomorrow.   [] You may resume normal activity tomorrow. Do not engage in strenuous activity that may place stress on your incision. [x] Do not drive for 3-5 days and avoid heavy lifting, tugging, pullings greater than 10 lbs until seen in the office. DIET INSTRUCTIONS:  [x]Regular diet as tolerated. MEDICATIONS  [x]Prescription sent with you to be used as directed. []Lortab   []Norco   []Percocet   []Tylenol #3   [x]Tramadol   Do not drink alcohol or drive while taking these medications. You may experience dizziness or drowsiness with these medications. You may also experience constipation which can be relieved with stool softners or laxatives. [x]You may resume your daily prescription medication schedule unless otherwise specified. [x]Do not take 325mg Aspirin or other blood thinners such as Coumadin or Plavix for 5 days. WOUND/DRESSING INSTRUCTIONS:  Always ensure you and your care giver clean hands before and after caring for the wound. [] Keep dressing clean and dry for 48 hours. Change when soiled or wet. [] Allow steri-strips to fall off on their own.   [] Ice operative site for 20 minutes 4 times a day. [x] May wash over incision in shower in 24 hours, but do not soak in a bath.  [] Take sitz bath for 20 minutes twice daily and after bowel movements. [] Keep the abdominal binder in place during the day. May remove to shower and at night.  [] Remove packing from wound in 24 hours and replace with AMD dressings daily.   [] Empty ALYCE drain daily and record the amounts. ABDOMINAL/LAPAROSCOPIC SURGERY  [x]You are encouraged to get up and move around as this helps with the circulation and speeds up the healing process. [x]Breath deeply and cough from time to time. This helps to clear your lungs and helps prevent pneumonia. [x]Supporting your incision with a pillow or your hand helps to minimize discomfort and pain. []Laparoscopic patients may develop shoulder pain in the first 48 hours from the gas used during the procedure. FOLLOW-UP CARE. SPECIFICALLY WATCH FOR:   Fever over 101 degrees by mouth   Increased redness, warmth, hardness at operative site. Blood soaked dressing (small amounts of oozing may be normal.)   Increased or progressive drainage from the surgical area   Inability to urinate or blood in the urine   Pain not relieved by the medications ordered   Persistent nausea and/or vomiting, unable to retain fluids. FOLLOW-UP APPOINTMENT   [x]1 week   []2 weeks   []Other    Call my office if you have any problem that concerns you [phone]. After hours, you can reach the answering service via the office phone number. IF YOU NEED IMMEDIATE ATTENTION, GO TO THE EMERGENCY ROOM AND YOUR DOCTOR WILL BE CONTACTED. Terrance Grande MD MD  [address] [place] [id number]  AINSLEY PARDO II.[email or web address], [address]  Electronically signed 12/8/2022 at 11:50 AM

## 2022-12-08 NOTE — ANESTHESIA POSTPROCEDURE EVALUATION
Department of Anesthesiology  Postprocedure Note    Patient: Ida Braswell  MRN: [id number]  YOB: 1965  Date of evaluation: 12/8/2022      Procedure Summary     Date: 12/08/22 Room / Location: [facility]    Anesthesia Start: 1108 Anesthesia Stop: 1152    Procedure: Umbilical Exploration with Mesh Removal (Abdomen) Diagnosis:       Open wound of umbilical region, initial encounter      (Open wound of umbilical region, initial encounter [S31.105A])    Surgeons: Herman Hazel MD Responsible Provider: Roger Chin MD    Anesthesia Type: MAC ASA Status: 2          Anesthesia Type: No value filed.     Irving Phase I:      Irving Phase II: Irving Score: 10      Anesthesia Post Evaluation    Patient location during evaluation: PACU  Patient participation: complete - patient participated  Level of consciousness: awake and alert  Airway patency: patent  Nausea & Vomiting: no nausea  Complications: no  Cardiovascular status: blood pressure returned to baseline and hemodynamically stable  Respiratory status: acceptable and spontaneous ventilation  Hydration status: euvolemic

## 2022-12-08 NOTE — H&P
[address] [address]  History and Physical Update    Pt Name: Jolanta Gamez  MRN: [id number]  YOB: 1965  Date of evaluation: 12/8/2022    [x] I have examined the patient and reviewed the H&P/Consult and there are no changes to the patient or plans. [] I have examined the patient and reviewed the H&P/Consult and have noted the following changes:        Clary Sorensen MD MD  Electronically signed 12/8/2022 at 11:10 AM      Clary Sorensen MD   General Surgery  Follow up Patient Evaluation in Office  Pt Name: Jolanta Gamez  Date of Birth 1965   Today's Date: 12/5/2022  Medical Record Number: [id number]  Primary Care Provider: Phyllis Camacho MD  Chief Complaint:       Chief Complaint   Patient presents with    Follow Up After Procedure       S/p Incision and drainage of umbilicus abscess done in the procedure room-12/2/2022         ASSESSMENT   1. Open wound of umbilical region, initial encounter    2. History of umbilical hernia repair with mesh  3. History of breast cancer remotely  Open wound of umbilicus subsequent encounter abscess status post incision and drainage. No residual purulence. PLANS   Continue oral antibiotic  CT reviewed and I&D performed. No recurrent hernia. Schedule patient for umbilical exploration and debridement likely removal of mesh. 4.  Techniques and risks of surgery discussed with patient. Potential if no mesh involvement may just breed wound and leave mesh in place but would err on the side of mesh removal if possible. Risks include but not limited to further infections recurrent hernia as well as potential for bowel injury if any underlying adhesions. Patient expressed understanding all questions answered. 5.  Preoperative testing per anesthesia guidelines  6.   General anesthesia      SUBJECTIVE   History of present illness:  Linda Mejia is a 64y.o. year old female who is presenting today in the office for evaluation of periumbilical pain and some redness. Georgette Riddle is well-known to me. She was previously treated by myself for breast cancer in 2015. She underwent bilateral mastectomy at that time with insertion of tissue expanders. She has had no evidence of recurrent disease. She underwent a hysterectomy and had an incarcerated incisional hernia near the umbilicus. She underwent open repair at the time of her laparoscopic hysterectomy with small mesh placed. She been having no issues. Recently she noted more of a bulge in the area and some erythema and tenderness of the anterior abdominal wall. She has had no fever, chills or change in bowel habits. There is an area of erythema around the umbilicus which is very faint around 4 x 7 cm. There is mild tenderness at the umbilicus itself there is no obvious recurrence of hernia but there is some soft tissue fullness just below the umbilicus it is not reducible. This is only about 1 cm in size. She denies nausea or emesis. Interval history: Georgette Riddle had more swelling very focally at the umbilical skin with some necrosis of the skin. Last visit she underwent procedure with incision and drainage. Wound is markedly improved she has been having her  irrigated and packed no obvious purulence it is vikki down. I am still concerned about viability of the mesh underneath and recommend wound exploration with possible mesh removal.  She is agreeable.      Past Medical History  Past Medical History        Past Medical History:   Diagnosis Date    Breast cancer [facility]) 1     age 52    Cancer ([address] 2015, jan.     breast, left    Hypertension      Kidney stone      Thyroid disease            Past Surgical History  Past Surgical History         Past Surgical History:   Procedure Laterality Date    BREAST BIOPSY Right 2013     benign hyperplasia    BREAST BIOPSY Left 2015     invasive ductal carcinoma    BREAST RECONSTRUCTION   6/30/15     replace right tissue expander, revise left breast scar BREAST SURGERY   02/2015     bilateral mastectomy - left breast cancer    CHOLECYSTECTOMY   1990     laparoscopic    COLONOSCOPY         last one 2013    COSMETIC SURGERY   10/2015     bilateral breast reconstruction    [address] [facility]     left foot spur    HYSTERECTOMY (CERVIX STATUS UNKNOWN) N/A 4/8/2021     ROBOTIC HYSTERECTOMY WITH BSO performed by Karli Ponce MD at [address] [facility] Bilateral 2/11/15     BILATERAL MASTECTOMIES WITH LEFT SENTINEL NODE AND BILATERAL RECONSTRUCTION WITH DERMAL AND TISSUE EXPANDERS    OVARY REMOVAL Bilateral 2021    TONSILLECTOMY         adnoids as child    UMBILICAL HERNIA REPAIR N/A 8/2/5879     OPEN UMBILICAL HERNIA REPAIR WITH MESH performed by Kassidy Barnes MD at [facility] OR          Medications  Current Facility-Administered Medications          Current Outpatient Medications   Medication Sig Dispense Refill    Methylsulfonylmethane (MSM PO) Take by mouth daily        sulfamethoxazole-trimethoprim (BACTRIM DS;SEPTRA DS) 800-160 MG per tablet Take 1 tablet by mouth 2 times daily for 10 days 20 tablet 0    Ascorbic Acid (VITAMIN C) 250 MG tablet Take 250 mg by mouth daily        OLIVE LEAF PO Take by mouth 2 in the am and 2 in the evening        Zinc Sulfate (ZINC 15 PO) Take by mouth        lisinopril (PRINIVIL;ZESTRIL) 10 MG tablet Take 10 mg by mouth daily Take 2 tabs daily        loratadine (CLARITIN) 10 MG tablet Take 10 mg by mouth as needed        levothyroxine (SYNTHROID) 100 MCG tablet Take 100 mcg by mouth Daily. calcium citrate-vitamin D (CITRICAL + D) 315-250 MG-UNIT TABS Take  by mouth 2 times daily. No current facility-administered medications for this visit.          Allergies           Allergies   Allergen Reactions    Augmentin [Amoxicillin-Pot Clavulanate] Hives, Diarrhea and Swelling    Hydantoins      Other         Sodium pentathol  Makes me a \"crazy person\"  Steri strips caused redness and rash  bandaids - rash    Pcn [Penicillins]      Biaxin [Clarithromycin] Rash    Norco [Hydrocodone-Acetaminophen] Other (See Comments)       Diaphoretic, flushed    Vancomycin Itching and Rash       Family History  Family History         Family History   Problem Relation Age of Onset    Breast Cancer Maternal Aunt 36         age 42's at onset    Heart Disease Mother      Thyroid Cancer Mother 21    Heart Disease Father      Asthma Maternal Grandmother      Heart Disease Paternal Grandfather      Heart Attack Maternal Grandfather      Breast Cancer Paternal Aunt           late 62s    Ovarian Cancer Neg Hx      Diabetes Neg Hx      Kidney Disease Neg Hx      Stroke Neg Hx            SocialHistory  Social History               Socioeconomic History    Marital status:        Spouse name: Not on file    Number of children: Not on file    Years of education: Not on file    Highest education level: Not on file   Occupational History    Not on file   Tobacco Use    Smoking status: Never    Smokeless tobacco: Never   Substance and Sexual Activity    Alcohol use: Yes       Comment: social    Drug use: No    Sexual activity: Not on file   Other Topics Concern    Not on file   Social History Narrative    Not on file      Social Determinants of Health      Financial Resource Strain: Not on file   Food Insecurity: Not on file   Transportation Needs: Not on file   Physical Activity: Not on file   Stress: Not on file   Social Connections: Not on file   Intimate Partner Violence: Not on file   Housing Stability: Not on file              Review of Systems  Review of Systems   Constitutional:  Negative for activity change, chills, fatigue, fever and unexpected weight change. HENT:  Negative for congestion, sore throat, trouble swallowing and voice change. Eyes:  Negative for visual disturbance. Respiratory:  Negative for cough, shortness of breath and wheezing.     Cardiovascular: Negative for chest pain and palpitations. Gastrointestinal:  Positive for abdominal pain. Negative for blood in stool, nausea and vomiting. Some tenderness around the umbilicus. Endocrine: Negative for cold intolerance, heat intolerance and polydipsia. Genitourinary:  Negative for dysuria, flank pain and hematuria. Musculoskeletal:  Negative for gait problem, joint swelling and myalgias. Skin:  Positive for color change. Negative for rash. Periumbilical redness has resolved. Open wound at the umbilicus. Some superficial skin necrosis/slough   Allergic/Immunologic: Negative for immunocompromised state. Neurological:  Negative for dizziness, tremors, seizures and speech difficulty. Hematological:  Negative for adenopathy. Does not bruise/bleed easily. Psychiatric/Behavioral:  Negative for behavioral problems and confusion. OBJECTIVE      /60 (Site: Right Upper Arm, Position: Sitting, Cuff Size: Medium Adult)   Pulse 84   Temp 97.7 °F (36.5 °C) (Temporal)   Resp 18   Ht 5' 8\" (1.727 m)   Wt 183 lb 4.8 oz (83.1 kg)   LMP 06/12/2016   SpO2 98%   BMI 27.87 kg/m²       Physical Exam  Vitals reviewed. Constitutional:       Appearance: Normal appearance. She is well-developed. She is not ill-appearing or diaphoretic. HENT:      Head: Normocephalic and atraumatic. Nose: No congestion. Eyes:      General: No scleral icterus. Extraocular Movements: Extraocular movements intact. Neck:      Thyroid: No thyromegaly. Vascular: No JVD. Trachea: No tracheal deviation. Cardiovascular:      Rate and Rhythm: Normal rate. Pulmonary:      Effort: Pulmonary effort is normal. No respiratory distress. Breath sounds: Normal breath sounds. Abdominal:      General: There is no distension. Palpations: Abdomen is soft. There is no mass. Tenderness: There is abdominal tenderness. There is no guarding or rebound. Hernia: No hernia is present. Comments: Tenderness at umbilicus with faint fullness. No palpable fluctuance. Incision and drainage site vikki down but still small area open. Musculoskeletal:         General: No tenderness or deformity. Cervical back: Neck supple. No rigidity. Lymphadenopathy:      Cervical: No cervical adenopathy. Skin:     General: Skin is warm and dry. Coloration: Skin is not jaundiced or pale. Findings: No erythema or rash. Comments: Periumbilical erythema has resolved   Neurological:      General: No focal deficit present. Mental Status: She is alert and oriented to person, place, and time. Cranial Nerves: No cranial nerve deficit.    Psychiatric:         Mood and Affect: Mood normal.         Behavior: Behavior normal.               Lab Results   Component Value Date     WBC 6.9 04/14/2022     HGB 13.3 12/02/2022     HCT 41.0 12/02/2022      04/14/2022     ALT 61 04/14/2022     AST 30 04/14/2022      12/02/2022     K 4.7 12/02/2022     CL 98 12/02/2022     CREATININE 0.9 12/02/2022     BUN 13 12/02/2022     CO2 25 12/02/2022     TSH 1.360 04/14/2022            CT ABDOMEN PELVIS W IV CONTRAST Additional Contrast? Oral [[id number]]  Status: Final result

## 2022-12-08 NOTE — ANESTHESIA PRE PROCEDURE
Wound Evaluation    SUBJECTIVE  Pt reports no significant complaints; does report pain to (L) heel with manipulation of wound area and during dressing application    History of Present Illness: Chief Complaint   Chief Complaint   Patient presents with   • Wound Check     diabetic foot ulcer       Wound History  Patient states that he developed the wound on L heel over the last few months.  He states he did not have the wound until he saw his podiatrist who then put him on amoxicillin.  Patient states he had increasing pain in his left ankle and was sent by his primary care doctor  10/3---OR----  Incision and drainage, left foot  Partial calcanectomy, left lower extremity    Allergies  ALLERGIES:  Patient has no known allergies.    Past Medical History  Past Medical History:   Diagnosis Date   • Arthritis    • Bronchitis    • CHF (congestive heart failure) (CMS/[facility])    • COPD (chronic obstructive pulmonary disease) (CMS/[facility])    • Diabetes mellitus (CMS/[facility])    • Essential (primary) hypertension    • Spinal stenosis         Surgical History  Past Surgical History:   Procedure Laterality Date   • Cholecystectomy             Recent labwork shows:  Lab Results   Component Value Date    WBC 6.5 10/04/2021    HGB 11.2 (L) 10/04/2021    HCT 37.3 (L) 10/04/2021    BUN 24 (H) 10/04/2021    CREATININE 1.33 (H) 10/04/2021    TOTPROTEIN 7.7 09/28/2021    ALBUMIN 2.9 (L) 09/28/2021    INR 0.9 01/07/2019    CRP 7.7 (H) 09/28/2021    RESR 119 (H) 09/29/2021    HGBA1C 12.1 (H) 09/28/2021     Recent Labs   Lab 10/04/21  0019 10/04/21  0743 10/04/21  1050   GLUCOSE BEDSIDE 187* 301* 284*        Wound Assessments     10/04/21 1355   Vital Signs   Patient Currently in Pain Yes   Pain   Pain Assessment Tool Numeric Rating Scale 0-10   Pain Intensity   Numeric Rating Scale 0-10 8   Pain/ Comfort Interventions   Pain Interventions Positioning;Systematic relaxation   Comforting Interventions Distraction  Department of Anesthesiology  Preprocedure Note       Name:  Mary Leon   Age:  64 y.o.  :  1965                                          MRN:  [id number]         Date:  2022      Surgeon: Olamide Franco):  Mechelle Maldonado MD    Procedure: Procedure(s):  Umbilical Exploration possible Mesh Removal    Medications prior to admission:   Prior to Admission medications    Medication Sig Start Date End Date Taking? Authorizing Provider   Methylsulfonylmethane (MSM PO) Take by mouth daily    Historical Provider, MD   sulfamethoxazole-trimethoprim (BACTRIM DS;SEPTRA DS) 800-160 MG per tablet Take 1 tablet by mouth 2 times daily for 10 days 22  Mechelle Maldonado MD   Ascorbic Acid (VITAMIN C) 250 MG tablet Take 250 mg by mouth daily    Historical Provider, MD   OLIVE LEAF PO Take by mouth 2 in the am and 2 in the evening    Historical Provider, MD   Zinc Sulfate (ZINC 15 PO) Take by mouth    Historical Provider, MD   lisinopril (PRINIVIL;ZESTRIL) 10 MG tablet Take 10 mg by mouth daily Take 2 tabs daily    Historical Provider, MD   loratadine (CLARITIN) 10 MG tablet Take 10 mg by mouth as needed    Historical Provider, MD   levothyroxine (SYNTHROID) 100 MCG tablet Take 100 mcg by mouth Daily. Historical Provider, MD   calcium citrate-vitamin D (CITRICAL + D) 315-250 MG-UNIT TABS Take  by mouth 2 times daily.     Historical Provider, MD       Current medications:    Current Facility-Administered Medications   Medication Dose Route Frequency Provider Last Rate Last Admin    0.9 % sodium chloride infusion   IntraVENous Continuous Mechelle Maldonado MD        sodium chloride flush 0.9 % injection 5-40 mL  5-40 mL IntraVENous 2 times per day Mechelle Maldonado MD        sodium chloride flush 0.9 % injection 5-40 mL  5-40 mL IntraVENous PRN Mechelle Maldonado MD        0.9 % sodium chloride infusion   IntraVENous PRN Mechelle Maldonado MD        clindamycin (CLEOCIN) 900 mg in dextrose 5 % 50 mL IVPB  900 mg IntraVENous On   Wound Heel Left Surgical Wound   Date First Assessed: 10/03/21   Location: Heel  Laterality: Left  Primary Wound Type: Surgical Wound   Wound Care Team Consult Date 10/04/21   Dressing Assessment Intact;Drainage present   Dressing Activity Changed   Dressing Changed On   10/04/21   Wound Exudate Minimal;Serosanguineous   Cleansing Agent Normal saline   Wound Bed/Tissue Type Pink;Yellow  (Floseal remnants in wound base)   Periwound Condition Dry;Callus   Wound Edge Poorly defined   Wound Status   (initial PTWC assessment)   Wound Dressing Negative pressure device   Wound Last Measured 10/04/21   Wound Length (cm) 6.5 cm   Wound Width (cm) 7.3 cm   Wound Depth (cm) 1.1 cm   Wound Surface Area (cm^2) 47.45 cm^2   Wound Volume (cm^3) 52.195 cm^3   Negative Pressure Device 10/04/21 L heel VAC Ulta   Placement Date/Time: 10/04/21 1355   Inserted By: RAUL San, PT  Present at Time of Admission: No  Wound Site Association: L heel  Type: VAC Ulta   Setting (mmHg) 75;Continuous   VAC Canister Changed On 10/04/21   Dressing Black foam   Number of Foam Pieces Placed 1   Tubing Bridged To medial L lower leg   Tubing Y Connected? No   Total Surface Area of Wound with VAC (cm2) 47 cm2   Wound/Ostomy Follow-up ([facility] Wound Care Staff Use Only)   Patient Seen Today Yes by Therapist   Requires IP PT Wound Care follow-up? Yes       ASSESSMENT  Orders received; pt seen for evaluation of (L) heel wound s/p I and D with partial calcanectomy.  Wound demos pale pink tissue in plantar aspect of wound base intermixed with yellow subcutaneous tissue, adipose tissue.  Superior aspect of wound base demos large area with Floseal present; cotton tip applicator utilized to remove portions of this material, no bleeding observed.  Underlying tissue is yellow, fibrinous in nature with small areas of healthy pink tissue.  Bone was not directly palpated on assessment.  No odor or purulence noted; periwound is dry, without erythema, no fluctuance or  Call to [address] [place] [address] [place], MD        scopolamine (TRANSDERM-SCOP) transdermal patch 1 patch  1 patch TransDERmal Once Tony Watters MD           Allergies:     Allergies   Allergen Reactions    Augmentin [Amoxicillin-Pot Clavulanate] Hives, Diarrhea and Swelling    Hydantoins     Other      Sodium pentathol  Makes me a \"crazy person\"  Steri strips caused redness and rash  bandaids - rash    Pcn [Penicillins]     Biaxin [Clarithromycin] Rash    Norco [Hydrocodone-Acetaminophen] Other (See Comments)     Diaphoretic, flushed    Vancomycin Itching and Rash       Problem List:    Patient Active Problem List   Diagnosis Code    Breast cancer ([facility] C50.919    Incarcerated incisional hernia K43.0    S/P laparoscopic hysterectomy Z90.710       Past Medical History:        Diagnosis Date    Breast cancer [facility]) 1    age 52    Cancer ([facility] 2015, jan.    breast, left    Hypertension     Kidney stone     Thyroid disease        Past Surgical History:        Procedure Laterality Date    BREAST BIOPSY Right 2013    benign hyperplasia    BREAST BIOPSY Left 2015    invasive ductal carcinoma    BREAST RECONSTRUCTION  6/30/15    replace right tissue expander, revise left breast scar    BREAST SURGERY  02/2015    bilateral mastectomy - left breast cancer    CHOLECYSTECTOMY  1990    laparoscopic    COLONOSCOPY      last one 2013    COSMETIC SURGERY  10/2015    bilateral breast reconstruction    DILATION AND CURETTAGE OF [address]    left foot spur    HYSTERECTOMY (CERVIX STATUS UNKNOWN) N/A 4/8/2021    ROBOTIC HYSTERECTOMY WITH BSO performed by Rafi Ordonez MD at [address] [facility] Bilateral 2/11/15    BILATERAL MASTECTOMIES WITH LEFT SENTINEL NODE AND BILATERAL RECONSTRUCTION WITH DERMAL AND TISSUE EXPANDERS    OVARY REMOVAL Bilateral 2021    TONSILLECTOMY      adnoids as child    UMBILICAL HERNIA REPAIR N/A 1/5/8916    OPEN UMBILICAL HERNIA REPAIR WITH MESH crepitus noted.  Wound appropriate for trial of NPWT to promote granular proliferation and wound contraction.  Vac dressing applied as indicated and will plan to be changed on 10/6 to assess response to NPWT.  PTWC will cont to follow.  Wound Prognosis: Fair    Goals  Reduce wound size by 25%    Plan  NPWT dressing changes  Treatment frequency: 3x/wk, MWF  Discharge recommendations: TBD based on response to NPWT    Rick San, PT  10/4/2021     performed by Catherine Sumner MD at [address] [address] [address] History:    Social History     Tobacco Use    Smoking status: Never    Smokeless tobacco: Never   Substance Use Topics    Alcohol use: Yes     Comment: social                                Counseling given: Not Answered      Vital Signs (Current):   Vitals:    12/08/22 0959   BP: (!) 160/75   Pulse: 84   Resp: 16   Temp: 98.3 °F (36.8 °C)   TempSrc: Temporal   SpO2: 98%   Weight: 179 lb 12.8 oz (81.6 kg)   Height: 5' 8\" (1.727 m)                                              BP Readings from Last 3 Encounters:   12/08/22 (!) 160/75   12/05/22 128/60   12/02/22 118/60       NPO Status: Time of last liquid consumption: 2200                        Time of last solid consumption: 1845                        Date of last liquid consumption: 12/07/22                        Date of last solid food consumption: 12/07/22    BMI:   Wt Readings from Last 3 Encounters:   12/08/22 179 lb 12.8 oz (81.6 kg)   12/05/22 183 lb 4.8 oz (83.1 kg)   12/02/22 186 lb (84.4 kg)     Body mass index is 27.34 kg/m².     CBC:   Lab Results   Component Value Date/Time    WBC 6.9 04/14/2022 02:46 PM    RBC 4.72 04/14/2022 02:46 PM    HGB 13.3 12/02/2022 01:58 PM    HCT 41.0 12/02/2022 01:58 PM    MCV 86.9 04/14/2022 02:46 PM    RDW 13.7 06/28/2016 03:48 PM     04/14/2022 02:46 PM       CMP:   Lab Results   Component Value Date/Time     12/02/2022 01:58 PM    K 4.7 12/02/2022 01:58 PM    CL 98 12/02/2022 01:58 PM    CO2 25 12/02/2022 01:58 PM    BUN 13 12/02/2022 01:58 PM    CREATININE 0.9 12/02/2022 01:58 PM    LABGLOM >60 12/02/2022 01:23 PM    GLUCOSE 92 12/02/2022 01:58 PM    PROT 7.7 04/14/2022 02:46 PM    CALCIUM 10.1 12/02/2022 01:58 PM    BILITOT 0.5 04/14/2022 02:46 PM    ALKPHOS 119 04/14/2022 02:46 PM    AST 30 04/14/2022 02:46 PM    ALT 61 04/14/2022 02:46 PM       POC Tests: No results for input(s): POCGLU, POCNA, POCK, POCCL, POCBUN, POCHEMO, POCHCT in the last 72 hours. Coags: No results found for: PROTIME, INR, APTT    HCG (If Applicable):   Lab Results   Component Value Date    PREGTESTUR negative 04/08/2021    PREGSERUM NEGATIVE 01/31/2015        ABGs: No results found for: PHART, PO2ART, BCQ9WOF, WBX5RGZ, BEART, L8ZZMINE     Type & Screen (If Applicable):  No results found for: LABABO, LABRH    Drug/Infectious Status (If Applicable):  No results found for: HIV, HEPCAB    COVID-19 Screening (If Applicable):   Lab Results   Component Value Date/Time    COVID19 Not Detected 04/01/2021 03:08 PM           Anesthesia Evaluation    Airway: Mallampati: II  TM distance: >3 FB   Neck ROM: full  Mouth opening: > = 3 FB   Dental:          Pulmonary:Negative Pulmonary ROS and normal exam              Patient did not smoke on day of surgery. Cardiovascular:  Exercise tolerance: good (>4 METS),   (+) hypertension:,                   Neuro/Psych:   Negative Neuro/Psych ROS              GI/Hepatic/Renal: Neg GI/Hepatic/Renal ROS            Endo/Other:    (+) hypothyroidism::., .                 Abdominal:             Vascular: negative vascular ROS. Other Findings:           Anesthesia Plan      MAC     ASA 2     (Possible GA)  Induction: intravenous. MIPS: Postoperative opioids intended and Prophylactic antiemetics administered. Anesthetic plan and risks discussed with patient. Plan discussed with CRNA.                     Whitney Self MD   12/8/2022

## 2022-12-09 ENCOUNTER — TELEPHONE (OUTPATIENT)
Dept: SURGERY | Age: 57
End: 2022-12-09

## 2022-12-09 NOTE — TELEPHONE ENCOUNTER
S/p umbilical exploration, debridement explantation old ventralex mesh-12/8/2022  Called patient this am very sore, using pain medication. Up moving around this am-no nausea or vomiting. Eating and drinking. Incision looks fine. Urinating fine. Instructed to call with any issues.

## 2022-12-15 ENCOUNTER — OFFICE VISIT (OUTPATIENT)
Dept: SURGERY | Age: 57
End: 2022-12-15

## 2022-12-15 VITALS
WEIGHT: 177 LBS | SYSTOLIC BLOOD PRESSURE: 124 MMHG | TEMPERATURE: 97.8 F | OXYGEN SATURATION: 96 % | DIASTOLIC BLOOD PRESSURE: 84 MMHG | BODY MASS INDEX: 26.83 KG/M2 | HEART RATE: 82 BPM | HEIGHT: 68 IN

## 2022-12-15 DIAGNOSIS — S31.105A OPEN WOUND OF UMBILICAL REGION, INITIAL ENCOUNTER: Primary | ICD-10-CM

## 2022-12-15 DIAGNOSIS — Z09 POSTOP CHECK: ICD-10-CM

## 2022-12-15 DIAGNOSIS — L03.316 CELLULITIS OF UMBILICUS: ICD-10-CM

## 2022-12-15 PROCEDURE — 99024 POSTOP FOLLOW-UP VISIT: CPT | Performed by: SURGERY

## 2022-12-15 NOTE — PROGRESS NOTES
Kameron Espino MD   General Surgery  Postprocedure Evaluation in Office  Pt Name: Nanda Kim  Date of Birth 1965   Today's Date: 12/15/2022  Medical Record Number: [id number]  Primary Care Provider: Dannis Riedel, MD  Chief Complaint   Patient presents with    Post-Op Check     s/p umbilical exploration, debridement explantation old ventralex mesh-12/08/22     ASSESSMENT      1. Open wound of umbilical region, initial encounter    2. Cellulitis of umbilicus    3. Postop check         PLAN       OK to RTW Jan. 9 without restrictions    2. Follow-up for wound recheck in 4 weeks prior to return to work. 3.  Continue lifting restrictions no lifting over 25 pounds until next office visit. Gabby Jimenez is seen today for post-op follow-up. She is 1 week post umbilical exploration debridement and removal of old abdominal wall mesh with primary repair of fascial defect. Her incision is intact there is no drainage and no erythema. She has minimal pain. Denies any fever or chills. Medications    Current Outpatient Medications:     Methylsulfonylmethane (MSM PO), Take by mouth daily, Disp: , Rfl:     Ascorbic Acid (VITAMIN C) 250 MG tablet, Take 250 mg by mouth daily, Disp: , Rfl:     OLIVE LEAF PO, Take by mouth 2 in the am and 2 in the evening, Disp: , Rfl:     Zinc Sulfate (ZINC 15 PO), Take by mouth, Disp: , Rfl:     lisinopril (PRINIVIL;ZESTRIL) 10 MG tablet, Take 10 mg by mouth daily Take 2 tabs daily, Disp: , Rfl:     loratadine (CLARITIN) 10 MG tablet, Take 10 mg by mouth as needed, Disp: , Rfl:     levothyroxine (SYNTHROID) 100 MCG tablet, Take 100 mcg by mouth Daily. , Disp: , Rfl:     calcium citrate-vitamin D (CITRICAL + D) 315-250 MG-UNIT TABS, Take  by mouth 2 times daily. , Disp: , Rfl:     Allergies  Allergies   Allergen Reactions    Augmentin [Amoxicillin-Pot Clavulanate] Hives, Diarrhea and Swelling    Hydantoins     Other      Sodium pentathol  Makes me a \"crazy person\"  Steri strips caused redness and rash  bandaids - rash    Pcn [Penicillins]     Biaxin [Clarithromycin] Rash    Norco [Hydrocodone-Acetaminophen] Other (See Comments)     Diaphoretic, flushed    Vancomycin Itching and Rash       Review of Systems  History obtained from the patient. Constitutional: Denies any fever, chills, fatigue. Wound: Denies any rash, skin color changes or wound problems. Resp: Denies any cough, shortness of breath. CV: Denies any chest pain, orthopnea or syncope. GI: Positive for mild incisional discomfort only. Denies any nausea, vomiting, blood in the stool, constipation or diarrhea. OBJECTIVE     VITALS: /84 (Site: Right Upper Arm, Position: Sitting, Cuff Size: Medium Adult)   Pulse 82   Temp 97.8 °F (36.6 °C)   Ht 5' 8\" (1.727 m)   Wt 177 lb (80.3 kg)   LMP 2016   SpO2 96%   BMI 26.91 kg/m²     CONSTITUTIONAL: Alert and oriented times 3, no acute distress and cooperative to examination. SKIN: Warm and dry  INCISION: wound margins intact and healing well. No signs of infection. No drainage. No hernia. LUNGS: Lungs Clear  CARDIOVASCULAR: Normal Rate  ABDOMEN: Soft minimal incisional tenderness. Performed by: [address] [place] [address]. Pathology      [place], [place]            [id number]   Assoc.                                               Page 1 of [place] , [address]                                                       PROC: 2022   [place]                                    RECV: 2022   [address]                                    RPTD: 2022   [facility], [address]                       MRN:  [id number]     LOC: ASOR                       ACCT: [de-identified]  SEX: F                       : 1965  AGE: 64 Y                          PATHOLOGY REPORT                       ATTN: ALESHA Matto HAYDEE Velasco       Clinical Information: OPEN WOUND OF UMBILICAL REGION, INITIAL ENCOUNTER       FINAL DIAGNOSIS:   Mesh material in abdomen, removal:    Gross description only     Specimen:   ABDOMINAL MESH, OLD UMBILICAL FOR GROSS ONLY       Gross Examination:   The container is labeled Ruby Cobb, old umbilical mesh for   gross only. Received fresh is a fragment of pink-tan tissue with old   mesh material and sutures. The specimen measures about 3.2 x 2 x 1.2   cm. There are no discrete lesions. The specimen is for gross   description only. SIO/DKR:[id number]   [id number]                                                         <Sign Out Dr. Whit Beltran M.D., F.C.A.P.        [facility]  Printed on:  12/9/2022   Caitlin Kaye [address]   [place], [address] [employer] [facility]   Original print date: 12/09/2022      Specimen Collected: 12/08/22 12:53 EST Last Resulted: 12/09/22 10:07 EST

## 2023-01-05 ENCOUNTER — OFFICE VISIT (OUTPATIENT)
Dept: SURGERY | Age: 58
End: 2023-01-05

## 2023-01-05 VITALS
HEIGHT: 68 IN | TEMPERATURE: 97.6 F | BODY MASS INDEX: 28.19 KG/M2 | DIASTOLIC BLOOD PRESSURE: 90 MMHG | OXYGEN SATURATION: 96 % | WEIGHT: 186 LBS | SYSTOLIC BLOOD PRESSURE: 140 MMHG | HEART RATE: 75 BPM

## 2023-01-05 DIAGNOSIS — S31.105D OPEN WOUND OF UMBILICAL REGION, SUBSEQUENT ENCOUNTER: Primary | ICD-10-CM

## 2023-01-05 DIAGNOSIS — Z09 POSTOP CHECK: ICD-10-CM

## 2023-01-05 DIAGNOSIS — L03.316 CELLULITIS OF UMBILICUS: ICD-10-CM

## 2023-01-05 PROCEDURE — 99024 POSTOP FOLLOW-UP VISIT: CPT | Performed by: SURGERY

## 2023-01-05 NOTE — PROGRESS NOTES
Kath Naik MD   General Surgery  Postprocedure Evaluation in Office  Pt Name: Kellie Alvarez  Date of Birth 1965   Today's Date: 1/5/2023  Medical Record Number: [id number]  Primary Care Provider: Jayesh Tavrea MD  Chief Complaint   Patient presents with    Post-Op Check     s/p umbilical exploration, debridement explantation old ventralex mesh-12/08/22. Last office visit 12/15/22. Wound check     ASSESSMENT      1. Open wound of umbilical region, subsequent encounter    2. Cellulitis of umbilicus    3. Postop check           PLAN       OK to RTW Jan. 9 without restrictions    2. Wound healed. No hernia. 3.  Avoid lifting over 30 pounds until early February. 4.  Follow-up surgical clinic recheck for healing at that time. Patient counseled to call in interval with any questions or concerns. John Gutierres is seen today for post-op follow-up. She is 4 week weeks post umbilical exploration debridement and removal of old abdominal wall mesh with primary repair of fascial defect. Her incision is intact there is no drainage and no erythema. Wound is well-healed. Denies any fever or chills. Medications    Current Outpatient Medications:     Methylsulfonylmethane (MSM PO), Take by mouth daily, Disp: , Rfl:     Ascorbic Acid (VITAMIN C) 250 MG tablet, Take 250 mg by mouth daily, Disp: , Rfl:     OLIVE LEAF PO, Take by mouth 2 in the am and 2 in the evening, Disp: , Rfl:     Zinc Sulfate (ZINC 15 PO), Take by mouth, Disp: , Rfl:     lisinopril (PRINIVIL;ZESTRIL) 10 MG tablet, Take 10 mg by mouth daily Take 2 tabs daily, Disp: , Rfl:     loratadine (CLARITIN) 10 MG tablet, Take 10 mg by mouth as needed, Disp: , Rfl:     levothyroxine (SYNTHROID) 100 MCG tablet, Take 100 mcg by mouth Daily. , Disp: , Rfl:     calcium citrate-vitamin D (CITRICAL + D) 315-250 MG-UNIT TABS, Take  by mouth 2 times daily. , Disp: , Rfl:     Allergies  Allergies   Allergen Reactions    Augmentin [Amoxicillin-Pot Clavulanate] Hives, Diarrhea and Swelling    Hydantoins     Other      Sodium pentathol  Makes me a \"crazy person\"  Steri strips caused redness and rash  bandaids - rash    Pcn [Penicillins]     Biaxin [Clarithromycin] Rash    Norco [Hydrocodone-Acetaminophen] Other (See Comments)     Diaphoretic, flushed    Vancomycin Itching and Rash       Review of Systems  History obtained from the patient. Constitutional: Denies any fever, chills, fatigue. Wound: Denies any rash, skin color changes or wound problems. Resp: Denies any cough, shortness of breath. CV: Denies any chest pain, orthopnea or syncope. GI: . Denies any nausea, vomiting, blood in the stool, constipation or diarrhea. OBJECTIVE     VITALS: BP (!) 140/90 (Site: Right Upper Arm, Position: Sitting, Cuff Size: Medium Adult)   Pulse 75   Temp 97.6 °F (36.4 °C) (Temporal)   Ht 5' 8\" (1.727 m)   Wt 186 lb (84.4 kg)   LMP 2016   SpO2 96%   BMI 28.28 kg/m²     CONSTITUTIONAL: Alert and oriented times 3, no acute distress and cooperative to examination. SKIN: Warm and dry  INCISION: wound margins intact and healing well. No signs of infection. No drainage. No hernia. LUNGS: Lungs Clear  CARDIOVASCULAR: Normal Rate  ABDOMEN: Soft, minimal surgical site tenderness. Performed by: Marleni Flores. Pathology      [place], [place]            [id number]   Assoc.                                               [facility] [address]                                                       PROC: 2022   [facility]                                    RECV: 2022   [address]                                    RPTD: 2022   Cyndi Wright, [address]                       MRN:  [id number]     LOC: ASOR                       ACCT: [de-identified]  SEX: F                       : 1965  AGE: 64 Y                          PATHOLOGY REPORT                       ATTN: [facility] OLT REQ: [facility]       Copies To:   [facility] [employer]       Clinical Information: OPEN WOUND OF UMBILICAL REGION, INITIAL ENCOUNTER       FINAL DIAGNOSIS:   Mesh material in abdomen, removal:    Gross description only     Specimen:   ABDOMINAL MESH, OLD UMBILICAL FOR GROSS ONLY       Gross Examination:   The container is labeled Connie Marrufo, old umbilical mesh for   gross only. Received fresh is a fragment of pink-tan tissue with old   mesh material and sutures. The specimen measures about 3.2 x 2 x 1.2   cm. There are no discrete lesions. The specimen is for gross   description only. SIO/DKR:[id number]   [id number]                                                         <Sign Out Dr. Derek Winslow M.D., F.C.A.P.        [facility]  Printed on:  12/9/2022   Caitlin Kaye [facility]   Brady Sabina, [facility]   Original print date: 12/09/2022      Specimen Collected: 12/08/22 12:53 EST Last Resulted: 12/09/22 10:07 EST

## 2023-02-23 ENCOUNTER — OFFICE VISIT (OUTPATIENT)
Dept: SURGERY | Age: 58
End: 2023-02-23

## 2023-02-23 VITALS
DIASTOLIC BLOOD PRESSURE: 80 MMHG | TEMPERATURE: 97.6 F | HEART RATE: 86 BPM | BODY MASS INDEX: 28.2 KG/M2 | OXYGEN SATURATION: 97 % | WEIGHT: 186.1 LBS | SYSTOLIC BLOOD PRESSURE: 128 MMHG | HEIGHT: 68 IN

## 2023-02-23 DIAGNOSIS — Z09 POSTOP CHECK: Primary | ICD-10-CM

## 2023-02-23 DIAGNOSIS — Z51.89 VISIT FOR WOUND CHECK: ICD-10-CM

## 2023-02-23 NOTE — PROGRESS NOTES
Agustina Hernandez MD   General Surgery  Postprocedure Evaluation in Office  Pt Name: Marianna Dewey  Date of Birth 1965   Today's Date: 2/23/2023  Medical Record Number: [id number]  Primary Care Provider: Pati Brannon MD  Chief Complaint   Patient presents with    Wound Check     s/p umbilical exploration, debridement explantation old ventralex mesh-12/08/22. Last office visit 1/5/23 - Pain and redness on right side of incision     ASSESSMENT      1. Postop check    2. Visit for wound check    3. Patient complained of redness at umbilical incision  4. No clinical evidence of recurrent hernia       PLAN       Has returned to work. Sly Melgar was concerned that the lateral aspect of the right of her scar looked redder than it had been. There is no signs of erythema infection or fluctuance. Some pain to that side I see no evidence of infection. She was reassured. 2.  She will keep an eye on her incision if there is any changes she will call for recheck. 3.  Follow-up 6 months if no interval issues    SUBJECTIVE     Carmen Long is seen today for post-op follow-up. She is almost weeks post umbilical exploration debridement and removal of old abdominal wall mesh with primary repair of fascial defect. Her incision is intact there is no drainage and no erythema. Wound is well-healed. Denies any fever or chills. Interval history Sly Melgar called wanting her wound check. She was concerned that the right side of her well-healed incision looked redder than it had. It is redder than on the left. There is no fluctuance mild tenderness but no edema. Reported I would continue to observe and call for any changes. She is afebrile and denies any fevers. She has had no drainage. She can call for recheck at any time.     Medications    Current Outpatient Medications:     Methylsulfonylmethane (MSM PO), Take by mouth daily, Disp: , Rfl:     Ascorbic Acid (VITAMIN C) 250 MG tablet, Take 250 mg by mouth daily, Disp: , Rfl:     OLIVE LEAF PO, Take by mouth 2 in the am and 2 in the evening, Disp: , Rfl:     Zinc Sulfate (ZINC 15 PO), Take by mouth, Disp: , Rfl:     lisinopril (PRINIVIL;ZESTRIL) 10 MG tablet, Take 10 mg by mouth daily Take 2 tabs daily, Disp: , Rfl:     loratadine (CLARITIN) 10 MG tablet, Take 10 mg by mouth as needed, Disp: , Rfl:     levothyroxine (SYNTHROID) 100 MCG tablet, Take 100 mcg by mouth Daily. , Disp: , Rfl:     calcium citrate-vitamin D (CITRICAL + D) 315-250 MG-UNIT TABS, Take  by mouth 2 times daily. , Disp: , Rfl:     Allergies  Allergies   Allergen Reactions    Augmentin [Amoxicillin-Pot Clavulanate] Hives, Diarrhea and Swelling    Hydantoins     Other      Sodium pentathol  Makes me a \"crazy person\"  Steri strips caused redness and rash  bandaids - rash    Pcn [Penicillins]     Biaxin [Clarithromycin] Rash    Norco [Hydrocodone-Acetaminophen] Other (See Comments)     Diaphoretic, flushed    Vancomycin Itching and Rash       Review of Systems  History obtained from the patient. Constitutional: Denies any fever, chills, fatigue. Wound: Denies any rash, skin color changes or wound problems. Resp: Denies any cough, shortness of breath. CV: Denies any chest pain, orthopnea or syncope. GI: . Denies any nausea, vomiting, blood in the stool, constipation or diarrhea. OBJECTIVE     VITALS: /80 (Site: Right Upper Arm, Position: Sitting, Cuff Size: Medium Adult)   Pulse 86   Temp 97.6 °F (36.4 °C) (Temporal)   Ht 5' 8\" (1.727 m)   Wt 186 lb 1.6 oz (84.4 kg)   LMP 06/12/2016   SpO2 97%   BMI 28.30 kg/m²     CONSTITUTIONAL: Alert and oriented times 3, no acute distress and cooperative to examination. SKIN: Warm and dry  INCISION: wound margins intact and healing well. No signs of infection. No drainage. No hernia. LUNGS: Lungs Clear  CARDIOVASCULAR: Normal Rate  ABDOMEN: Soft, minimal surgical site tenderness.           Performed by: [address] [place] [address]. Pathology Eamon Castillo            [id number]   Assoc. Page 1 of [address]   [facility], [address]                                                       PROC: 2022   [facility]                                    RECV: 2022   [address]                                    RPTD: 2022   [facility], [address]                       MRN:  [id number]     LOC: ASOR                       ACCT: [de-identified]  SEX: F                       : 1965  AGE: 64 Y                          PATHOLOGY REPORT                       ATTN: ALESHA HUBBARD       Copies To:   Narcisa Randall       Clinical Information: OPEN WOUND OF UMBILICAL REGION, INITIAL ENCOUNTER       FINAL DIAGNOSIS:   Mesh material in abdomen, removal:    Gross description only     Specimen:   ABDOMINAL MESH, OLD UMBILICAL FOR GROSS ONLY       Gross Examination:   The container is labeled Lolly Cruel, old umbilical mesh for   gross only. Received fresh is a fragment of pink-tan tissue with old   mesh material and sutures. The specimen measures about 3.2 x 2 x 1.2   cm. There are no discrete lesions. The specimen is for gross   description only. SIO/DKR:[id number]   [id number]                                                         <Sign Out Dr. Carmelita Warner M.D., F.C.A.P.        [facility]/ [address] [address]  Printed on:  2022   Caitlin Kaye [address]   [facility], [address] [employer] [facility]   Original print date: 2022      Specimen Collected: 22 12:53 EST Last Resulted: 22 10:07 EST

## 2023-07-10 ENCOUNTER — OFFICE VISIT (OUTPATIENT)
Dept: SURGERY | Age: 58
End: 2023-07-10
Payer: COMMERCIAL

## 2023-07-10 VITALS
BODY MASS INDEX: 29.1 KG/M2 | TEMPERATURE: 97.3 F | HEIGHT: 68 IN | DIASTOLIC BLOOD PRESSURE: 76 MMHG | RESPIRATION RATE: 18 BRPM | HEART RATE: 84 BPM | WEIGHT: 192 LBS | OXYGEN SATURATION: 96 % | SYSTOLIC BLOOD PRESSURE: 120 MMHG

## 2023-07-10 DIAGNOSIS — S31.105D OPEN WOUND OF UMBILICAL REGION, SUBSEQUENT ENCOUNTER: ICD-10-CM

## 2023-07-10 DIAGNOSIS — Z51.89 VISIT FOR WOUND CHECK: Primary | ICD-10-CM

## 2023-07-10 PROCEDURE — 99212 OFFICE O/P EST SF 10 MIN: CPT | Performed by: SURGERY

## 2023-07-12 NOTE — PROGRESS NOTES
Arlen Alexander MD   General Surgery  Follow up  Evaluation in Office  Pt Name: Cristine West  Date of Birth 1965   Today's Date: 7/10/2023  Medical Record Number: [id number]  Primary Care Provider: Everton Singleton MD  Chief Complaint   Patient presents with    6 Month Follow-Up     s/p umbilical exploration, debridement explantation old ventralex mesh-12/08/22. Last visit 2/23/23     ASSESSMENT      1. Visit for wound check    2. Open wound of umbilical region, subsequent encounter    3. Remote history of breast cancer with mastectomy and reconstruction no evidence of recurrence. Follows with medical oncology. PLAN       Clinical exam no wound issues at umbilicus. No evidence of recurrent hernia post mesh removal and primary repair of fascia  Activity as tolerated without restrictions. Call as needed with any questions or concerns. Cherlynn Severs is seen today for post-op follow-up. She is almost weeks post umbilical exploration debridement and removal of old abdominal wall mesh with primary repair of fascial defect in December 2022. Her incision is intact there is no drainage and no erythema. Wound is well-healed. Repair of the fascia is intact no evidence of hernia. Denies abdominal pain.       Medications    Current Outpatient Medications:     Methylsulfonylmethane (MSM PO), Take by mouth daily, Disp: , Rfl:     Ascorbic Acid (VITAMIN C) 250 MG tablet, Take 1 tablet by mouth daily, Disp: , Rfl:     OLIVE LEAF PO, Take by mouth 2 in the am and 2 in the evening, Disp: , Rfl:     Zinc Sulfate (ZINC 15 PO), Take by mouth, Disp: , Rfl:     lisinopril (PRINIVIL;ZESTRIL) 10 MG tablet, Take 1 tablet by mouth daily Take 2 tabs daily, Disp: , Rfl:     loratadine (CLARITIN) 10 MG tablet, Take 1 tablet by mouth as needed, Disp: , Rfl:     levothyroxine (SYNTHROID) 100 MCG tablet, Take 1 tablet by mouth Daily, Disp: , Rfl:     calcium citrate-vitamin D (CITRICAL + D) 315-250 MG-UNIT

## 2023-12-13 ENCOUNTER — HOSPITAL ENCOUNTER (OUTPATIENT)
Dept: MRI IMAGING | Age: 58
Discharge: HOME | End: 2023-12-13
Attending: INTERNAL MEDICINE
Payer: COMMERCIAL

## 2023-12-13 DIAGNOSIS — C50.411 CARCINOMA OF UPPER-OUTER QUADRANT OF FEMALE BREAST, RIGHT (HCC): ICD-10-CM

## 2023-12-13 LAB — POC CREATININE WHOLE BLOOD: 1.2 MG/DL (ref 0.5–1.2)

## 2023-12-13 PROCEDURE — C8908 MRI W/O FOL W/CONT, BREAST,: HCPCS

## 2023-12-13 PROCEDURE — 82565 ASSAY OF CREATININE: CPT

## 2023-12-13 PROCEDURE — 6360000004 HC RX CONTRAST MEDICATION: Performed by: INTERNAL MEDICINE

## 2023-12-13 PROCEDURE — A9579 GAD-BASE MR CONTRAST NOS,1ML: HCPCS | Performed by: INTERNAL MEDICINE

## 2023-12-13 RX ADMIN — GADOTERIDOL 20 ML: 279.3 INJECTION, SOLUTION INTRAVENOUS at 14:55

## 2024-01-03 ENCOUNTER — APPOINTMENT (OUTPATIENT)
Dept: GENERAL RADIOLOGY | Age: 59
End: 2024-01-03
Payer: COMMERCIAL

## 2024-01-03 ENCOUNTER — HOSPITAL ENCOUNTER (EMERGENCY)
Age: 59
Discharge: HOME | End: 2024-01-03
Payer: COMMERCIAL

## 2024-01-03 VITALS
DIASTOLIC BLOOD PRESSURE: 82 MMHG | SYSTOLIC BLOOD PRESSURE: 145 MMHG | HEART RATE: 81 BPM | RESPIRATION RATE: 15 BRPM | TEMPERATURE: 98.1 F | OXYGEN SATURATION: 97 %

## 2024-01-03 DIAGNOSIS — U07.1 COVID-19: Primary | ICD-10-CM

## 2024-01-03 DIAGNOSIS — R05.1 ACUTE COUGH: ICD-10-CM

## 2024-01-03 LAB
ALBUMIN SERPL BCG-MCNC: 4.2 G/DL (ref 3.5–5.1)
ALP SERPL-CCNC: 118 U/L (ref 38–126)
ALT SERPL W/O P-5'-P-CCNC: 70 U/L (ref 11–66)
ANION GAP SERPL CALC-SCNC: 14 MEQ/L (ref 8–16)
AST SERPL-CCNC: 32 U/L (ref 5–40)
BASOPHILS ABSOLUTE: 0.1 THOU/MM3 (ref 0–0.1)
BASOPHILS NFR BLD AUTO: 0.8 %
BILIRUB SERPL-MCNC: 0.9 MG/DL (ref 0.3–1.2)
BUN SERPL-MCNC: 15 MG/DL (ref 7–22)
CALCIUM SERPL-MCNC: 9.8 MG/DL (ref 8.5–10.5)
CHLORIDE SERPL-SCNC: 101 MEQ/L (ref 98–111)
CO2 SERPL-SCNC: 26 MEQ/L (ref 23–33)
CREAT SERPL-MCNC: 0.7 MG/DL (ref 0.4–1.2)
D DIMER PPP IA.FEU-MCNC: 499 NG/ML FEU (ref 0–500)
DEPRECATED RDW RBC AUTO: 41.1 FL (ref 35–45)
EKG ATRIAL RATE: 80 BPM
EKG P AXIS: 61 DEGREES
EKG P-R INTERVAL: 150 MS
EKG Q-T INTERVAL: 368 MS
EKG QRS DURATION: 78 MS
EKG QTC CALCULATION (BAZETT): 424 MS
EKG R AXIS: 59 DEGREES
EKG T AXIS: 74 DEGREES
EKG VENTRICULAR RATE: 80 BPM
EOSINOPHIL NFR BLD AUTO: 3.5 %
EOSINOPHILS ABSOLUTE: 0.2 THOU/MM3 (ref 0–0.4)
ERYTHROCYTE [DISTWIDTH] IN BLOOD BY AUTOMATED COUNT: 13.1 % (ref 11.5–14.5)
FLUAV RNA RESP QL NAA+PROBE: NOT DETECTED
FLUBV RNA RESP QL NAA+PROBE: NOT DETECTED
GFR SERPL CREATININE-BSD FRML MDRD: > 60 ML/MIN/1.73M2
GLUCOSE SERPL-MCNC: 112 MG/DL (ref 70–108)
HCT VFR BLD AUTO: 38.7 % (ref 37–47)
HGB BLD-MCNC: 12.4 GM/DL (ref 12–16)
IMM GRANULOCYTES # BLD AUTO: 0.02 THOU/MM3 (ref 0–0.07)
IMM GRANULOCYTES NFR BLD AUTO: 0.3 %
LYMPHOCYTES ABSOLUTE: 1.2 THOU/MM3 (ref 1–4.8)
LYMPHOCYTES NFR BLD AUTO: 18.8 %
MAGNESIUM SERPL-MCNC: 2.3 MG/DL (ref 1.6–2.4)
MCH RBC QN AUTO: 27.7 PG (ref 26–33)
MCHC RBC AUTO-ENTMCNC: 32 GM/DL (ref 32.2–35.5)
MCV RBC AUTO: 86.6 FL (ref 81–99)
MONOCYTES ABSOLUTE: 0.7 THOU/MM3 (ref 0.4–1.3)
MONOCYTES NFR BLD AUTO: 10.3 %
NEUTROPHILS NFR BLD AUTO: 66.3 %
NRBC BLD AUTO-RTO: 0 /100 WBC
NT-PROBNP SERPL IA-MCNC: 86.9 PG/ML (ref 0–124)
PLATELET # BLD AUTO: 312 THOU/MM3 (ref 130–400)
PMV BLD AUTO: 9.5 FL (ref 9.4–12.4)
POTASSIUM SERPL-SCNC: 4.6 MEQ/L (ref 3.5–5.2)
PROT SERPL-MCNC: 7.7 G/DL (ref 6.1–8)
RBC # BLD AUTO: 4.47 MILL/MM3 (ref 4.2–5.4)
S PYO AG THROAT QL: NEGATIVE
S PYO THROAT QL CULT: NORMAL
SARS-COV-2 RNA RESP QL NAA+PROBE: DETECTED
SEGMENTED NEUTROPHILS ABSOLUTE COUNT: 4.4 THOU/MM3 (ref 1.8–7.7)
SODIUM SERPL-SCNC: 141 MEQ/L (ref 135–145)
TROPONIN, HIGH SENSITIVITY: < 6 NG/L (ref 0–12)
WBC # BLD AUTO: 6.6 THOU/MM3 (ref 4.8–10.8)

## 2024-01-03 PROCEDURE — 6370000000 HC RX 637 (ALT 250 FOR IP)

## 2024-01-03 PROCEDURE — 36415 COLL VENOUS BLD VENIPUNCTURE: CPT

## 2024-01-03 PROCEDURE — 87880 STREP A ASSAY W/OPTIC: CPT

## 2024-01-03 PROCEDURE — 87070 CULTURE OTHR SPECIMN AEROBIC: CPT

## 2024-01-03 PROCEDURE — 85025 COMPLETE CBC W/AUTO DIFF WBC: CPT

## 2024-01-03 PROCEDURE — 93005 ELECTROCARDIOGRAM TRACING: CPT

## 2024-01-03 PROCEDURE — 93010 ELECTROCARDIOGRAM REPORT: CPT | Performed by: INTERNAL MEDICINE

## 2024-01-03 PROCEDURE — 80053 COMPREHEN METABOLIC PANEL: CPT

## 2024-01-03 PROCEDURE — 83735 ASSAY OF MAGNESIUM: CPT

## 2024-01-03 PROCEDURE — 85379 FIBRIN DEGRADATION QUANT: CPT

## 2024-01-03 PROCEDURE — 83880 ASSAY OF NATRIURETIC PEPTIDE: CPT

## 2024-01-03 PROCEDURE — 87636 SARSCOV2 & INF A&B AMP PRB: CPT

## 2024-01-03 PROCEDURE — 84484 ASSAY OF TROPONIN QUANT: CPT

## 2024-01-03 PROCEDURE — 99285 EMERGENCY DEPT VISIT HI MDM: CPT

## 2024-01-03 PROCEDURE — 71046 X-RAY EXAM CHEST 2 VIEWS: CPT

## 2024-01-03 RX ORDER — BENZONATATE 100 MG/1
100 CAPSULE ORAL 3 TIMES DAILY PRN
Qty: 30 CAPSULE | Refills: 0 | Status: SHIPPED | OUTPATIENT
Start: 2024-01-03 | End: 2024-01-13

## 2024-01-03 RX ORDER — BENZONATATE 100 MG/1
100 CAPSULE ORAL 3 TIMES DAILY PRN
Status: DISCONTINUED | OUTPATIENT
Start: 2024-01-03 | End: 2024-01-03 | Stop reason: HOSPADM

## 2024-01-03 RX ORDER — ACETAMINOPHEN 500 MG
500 TABLET ORAL 4 TIMES DAILY PRN
Qty: 120 TABLET | Refills: 0 | Status: SHIPPED | OUTPATIENT
Start: 2024-01-03

## 2024-01-03 RX ORDER — IBUPROFEN 400 MG/1
400 TABLET ORAL EVERY 6 HOURS PRN
Qty: 120 TABLET | Refills: 0 | Status: SHIPPED | OUTPATIENT
Start: 2024-01-03

## 2024-01-03 RX ADMIN — BENZONATATE 100 MG: 100 CAPSULE ORAL at 07:24

## 2024-01-03 RX ADMIN — BENZOCAINE 6 MG-MENTHOL 10 MG LOZENGES 1 LOZENGE: at 07:51

## 2024-01-03 ASSESSMENT — PAIN DESCRIPTION - DESCRIPTORS: DESCRIPTORS: ACHING

## 2024-01-03 ASSESSMENT — PAIN SCALES - GENERAL: PAINLEVEL_OUTOF10: 6

## 2024-01-03 ASSESSMENT — PAIN DESCRIPTION - LOCATION: LOCATION: CHEST

## 2024-01-03 ASSESSMENT — PAIN - FUNCTIONAL ASSESSMENT: PAIN_FUNCTIONAL_ASSESSMENT: 0-10

## 2024-01-03 NOTE — DISCHARGE INSTRUCTIONS
Return to ER for uncontrolled chest pain/shortness of breath.  Return for fevers uncontrolled with ordered medications.  Return for any other urgent/emergent medical concerns.    Please take Tylenol/ibuprofen in alternating fashion for the next 2-3 days for symptomatic improvement.  Optimize water intake and keep urine clear/pale yellow to decrease side effects of these medications.  Please take your Tessalon Perles as prescribed to help with your cough symptoms.    Call your HR department and notify them you are COVID-positive and adhere to their COVID policies.    Please quarantine as well as you can to decrease the spread of COVID-19.    I hope you are feeling better soon!

## 2024-01-03 NOTE — ED TRIAGE NOTES
Patient presents to ED with c/o cough x15 days. States that it is now causing pain in the upper part of her chest that wraps around into her back. Denies any fever, vomiting, or diarrhea. States that she has been using over the counter decongestants with no relief. EKG completed in triage. Call light in reach.

## 2024-01-03 NOTE — ED PROVIDER NOTES
[facility] EMERGENCY DEPT      EMERGENCY MEDICINE     Pt Name: Monse Castro  MRN: [id number]  Birthdate 1965  Date of evaluation: 1/3/2024  Provider: ABRAN Cunha CNP    CHIEF COMPLAINT       Chief Complaint   Patient presents with    Cough     HISTORY OF PRESENT ILLNESS   Monse Castro is a pleasant 58 y.o. female who presents to the emergency department from home by personal transportation with chief complaint of cough.  Occurring for approximately 15 days \"seen doctor about a week before Beau and started on antibiotics initially felt a little bit better but unfortunately has worsened over the last week.\"  States she was sounding hoarse over New Year's Day and has been unable to sleep because of her cough since 350 yesterday morning.  Additional complaint of left-sided nosebleed it has been intermittent she attributes to taking decongestants and nasal sprays.  States she did not want to come in but her  encouraged her to arrive today.    Pertinent medical history includes breast cancer, in remission, denies any decreased immune system or chemo/radiation.  Recent MRI of the shows left implant rupture.    Denies shortness of breath, admits to chest pain associated with cough only.  Denies left-sided chest pain or radiation down arm/neck.  Denies fever/chills.  Denies any nausea or vomiting.  Denies history of clots or recent trauma.  Denies being on hormones.    Patient denies history of COVID infection.  Admits to COVID vaccination but is not eligible for booster because of historical COVID vaccination reaction    History obtained from patient  PASTMEDICAL HISTORY     Past Medical History:   Diagnosis Date    Breast cancer (HCC) 2015    age 49    Cancer (HCC) 2015, jan.    breast, left    Hypertension     Hypothyroidism     Kidney stone     Thyroid disease        Patient Active Problem List   Diagnosis Code    Breast cancer (HCC) C50.919    Incarcerated incisional

## 2024-01-05 LAB — BACTERIA THROAT AEROBE CULT: NORMAL

## 2024-01-25 ENCOUNTER — OFFICE VISIT (OUTPATIENT)
Dept: SURGERY | Age: 59
End: 2024-01-25
Payer: COMMERCIAL

## 2024-01-25 VITALS
HEART RATE: 81 BPM | OXYGEN SATURATION: 95 % | SYSTOLIC BLOOD PRESSURE: 143 MMHG | TEMPERATURE: 97.6 F | HEIGHT: 68 IN | DIASTOLIC BLOOD PRESSURE: 84 MMHG | BODY MASS INDEX: 28.72 KG/M2 | WEIGHT: 189.5 LBS

## 2024-01-25 DIAGNOSIS — S31.105D OPEN WOUND OF UMBILICAL REGION, SUBSEQUENT ENCOUNTER: Primary | ICD-10-CM

## 2024-01-25 DIAGNOSIS — T81.41XA INFECTION INVOLVING STITCH WITH ABSCESS: ICD-10-CM

## 2024-01-25 DIAGNOSIS — I10 ESSENTIAL HYPERTENSION: ICD-10-CM

## 2024-01-25 PROCEDURE — 99213 OFFICE O/P EST LOW 20 MIN: CPT | Performed by: SURGERY

## 2024-01-25 PROCEDURE — 3077F SYST BP >= 140 MM HG: CPT | Performed by: SURGERY

## 2024-01-25 PROCEDURE — 3079F DIAST BP 80-89 MM HG: CPT | Performed by: SURGERY

## 2024-01-25 NOTE — PROGRESS NOTES
Amount of Fibroglandular Tissue a - Almost entirely fatty a - Almost entirely fatty a - Almost entirely fatty    Background Parenchymal Enhancement a - Minimal a - Minimal a - Minimal Symmetric   Details    Reading Physician Reading Date Result Priority   Rosalinda Kelley MD  [phone] 12/14/2023 Routine     Narrative & Impression  EXAM: MRI BREAST BILATERAL W WO CONTRAST      INDICATION: History of right breast cancer. Bilateral mastectomy and implant reconstruction.     HISTORY: 58 years, Female, Carcinoma of upper-outer quadrant of female breast, right (HCC)     COMPARISON: Bilateral breast MRI 12/9/2020.     TECHNIQUE: Axial and coronal T2 STIR, sagittal T1, axial T1 fat saturated pre and postcontrast dynamic imaging was performed of both breasts. Intravenous ProHance gadolinium was given. Images were reviewed on a separate dedicated 3-D workstation and time   intensity curves were analyzed.      FINDINGS:     Amount of Fibroglandular Tissue: The breast parenchyma is fatty.  Background Parenchymal Enhancement:  Minimal.     Right breast: There is an intact subpectoral implant. There is a stable enhancing lymph node in the outer central right breast There are no areas of suspicious enhancement. There is no suspicious morphology. No dominant lesions are present.     Left breast: There is a new intracapsular rupture of the left breast implant. No free silicone is seen. This is a change compared to the prior exam. There is a stable enhancing lymph node in the outer central left breast. There are no areas of suspicious   enhancement. There is no suspicious morphology. No dominant lesions are present.     There are normal lymph nodes in the axilla bilaterally.There are no abnormalities in the visualized bones or with the mediastinum. The patient has diffuse enlargement of the thyroid gland. This was also present previously. The has had an interval thyroid   ultrasound.               IMPRESSION:     1. No MR

## 2024-01-26 ENCOUNTER — OFFICE VISIT (OUTPATIENT)
Dept: SURGERY | Age: 59
End: 2024-01-26
Payer: COMMERCIAL

## 2024-01-26 VITALS
HEIGHT: 67 IN | SYSTOLIC BLOOD PRESSURE: 128 MMHG | WEIGHT: 190 LBS | RESPIRATION RATE: 16 BRPM | DIASTOLIC BLOOD PRESSURE: 86 MMHG | BODY MASS INDEX: 29.82 KG/M2 | HEART RATE: 67 BPM | OXYGEN SATURATION: 97 %

## 2024-01-26 DIAGNOSIS — T85.43XA RUPTURED LEFT BREAST IMPLANT, INITIAL ENCOUNTER: Primary | ICD-10-CM

## 2024-01-26 DIAGNOSIS — Z98.890 HISTORY OF RECONSTRUCTION OF BOTH BREASTS: ICD-10-CM

## 2024-01-26 DIAGNOSIS — C50.912 MALIGNANT NEOPLASM OF LEFT FEMALE BREAST, UNSPECIFIED ESTROGEN RECEPTOR STATUS, UNSPECIFIED SITE OF BREAST (HCC): ICD-10-CM

## 2024-01-26 PROCEDURE — 99204 OFFICE O/P NEW MOD 45 MIN: CPT | Performed by: PLASTIC SURGERY

## 2024-01-26 ASSESSMENT — ENCOUNTER SYMPTOMS
GASTROINTESTINAL NEGATIVE: 1
COUGH: 0
WHEEZING: 0
RESPIRATORY NEGATIVE: 1
ABDOMINAL PAIN: 0
SHORTNESS OF BREATH: 0
BLOOD IN STOOL: 0
SORE THROAT: 0

## 2024-01-26 NOTE — PROGRESS NOTES
Subjective:      Patient ID: Monse Castro is a 58 y.o. female.    HPI  Patient presents today for rupture of left breast implant. Patient had bilateral breast MRI on 12/13/23. She has a history of left breast cancer in 2015 and underwent bilateral mastectomy at that time per Dr. Durand. She did not need any additional treatment, other than the tamoxifen for 5 years. Later in the year, she was able to undergo bilateral breast reconstruction per Dr. Judge (recently relocated to [place].    Review of Systems   Constitutional: Negative.    HENT: Negative.     Respiratory: Negative.     Cardiovascular: Negative.    Gastrointestinal: Negative.    Musculoskeletal: Negative.    Neurological: Negative.        Objective:   Physical Exam  Vitals and nursing note reviewed. Exam conducted with a chaperone present.   Constitutional:       Appearance: Normal appearance.   HENT:      Head: Normocephalic and atraumatic.      Mouth/Throat:      Mouth: Mucous membranes are moist.   Eyes:      Extraocular Movements: Extraocular movements intact.      Conjunctiva/sclera: Conjunctivae normal.      Pupils: Pupils are equal, round, and reactive to light.   Pulmonary:      Effort: Pulmonary effort is normal.   Chest:      Comments:     Breasts symmetric volume.  Bilateral soft, Baker 1.  MRI shows intracapsular rupture left breast implant.    Skin:     General: Skin is warm and dry.   Neurological:      General: No focal deficit present.      Mental Status: She is alert and oriented to person, place, and time.         Assessment:      intracapsular rupture left breast implant.      Plan:      Schedule for capsulectomy and removal ruptured implant and replacement. I discussed the procedure, aftercare, restrictions and follow-up.  I have discussed with the patient the indication, alternatives, and the possible risks and/or complications which include but are not limited to those delineated on the consent form for the planned

## 2024-02-15 ENCOUNTER — OFFICE VISIT (OUTPATIENT)
Dept: SURGERY | Age: 59
End: 2024-02-15
Payer: COMMERCIAL

## 2024-02-15 VITALS
DIASTOLIC BLOOD PRESSURE: 84 MMHG | OXYGEN SATURATION: 98 % | SYSTOLIC BLOOD PRESSURE: 136 MMHG | WEIGHT: 190 LBS | BODY MASS INDEX: 29.82 KG/M2 | TEMPERATURE: 97.1 F | RESPIRATION RATE: 18 BRPM | HEART RATE: 73 BPM | HEIGHT: 67 IN

## 2024-02-15 DIAGNOSIS — Z51.89 VISIT FOR WOUND CHECK: ICD-10-CM

## 2024-02-15 DIAGNOSIS — S31.105D OPEN WOUND OF UMBILICAL REGION, SUBSEQUENT ENCOUNTER: Primary | ICD-10-CM

## 2024-02-15 DIAGNOSIS — I10 ESSENTIAL HYPERTENSION: ICD-10-CM

## 2024-02-15 PROCEDURE — 99213 OFFICE O/P EST LOW 20 MIN: CPT | Performed by: SURGERY

## 2024-02-15 PROCEDURE — 3075F SYST BP GE 130 - 139MM HG: CPT | Performed by: SURGERY

## 2024-02-15 PROCEDURE — 3079F DIAST BP 80-89 MM HG: CPT | Performed by: SURGERY

## 2024-02-15 NOTE — PROGRESS NOTES
Behavior: Behavior normal.         Lab Results   Component Value Date    WBC 6.6 01/03/2024    HGB 12.4 01/03/2024    HCT 38.7 01/03/2024     01/03/2024    ALT 70 (H) 01/03/2024    AST 32 01/03/2024     01/03/2024    K 4.6 01/03/2024     01/03/2024    CREATININE 0.7 01/03/2024    BUN 15 01/03/2024    CO2 26 01/03/2024    TSH 1.700 04/18/2023       Order: [id number]  Status: Final result       Visible to patient: Yes (not seen)       Next appt: Today at 03:15 PM in Plastic Surgery (Roseanne Dillon MD)       Dx: Carcinoma of upper-outer quadrant of ...    0 Result Notes  Assessment    Overall   2 - Benign   Mammography Recommendations     Side Due   Clinical Follow-up  Resolved     Breast Density     Overall Left Right Symmetry   Amount of Fibroglandular Tissue a - Almost entirely fatty a - Almost entirely fatty a - Almost entirely fatty    Background Parenchymal Enhancement a - Minimal a - Minimal a - Minimal Symmetric   Details    Reading Physician Reading Date Result Priority   Rosalinda Kelley MD  [phone] 12/14/2023 Routine     Narrative & Impression  EXAM: MRI BREAST BILATERAL W WO CONTRAST      INDICATION: History of right breast cancer. Bilateral mastectomy and implant reconstruction.     HISTORY: 58 years, Female, Carcinoma of upper-outer quadrant of female breast, right (HCC)     COMPARISON: Bilateral breast MRI 12/9/2020.     TECHNIQUE: Axial and coronal T2 STIR, sagittal T1, axial T1 fat saturated pre and postcontrast dynamic imaging was performed of both breasts. Intravenous ProHance gadolinium was given. Images were reviewed on a separate dedicated 3-D workstation and time   intensity curves were analyzed.      FINDINGS:     Amount of Fibroglandular Tissue: The breast parenchyma is fatty.  Background Parenchymal Enhancement:  Minimal.     Right breast: There is an intact subpectoral implant. There is a stable enhancing lymph node in the outer central right breast There are no

## 2024-03-08 ENCOUNTER — ANESTHESIA EVENT (OUTPATIENT)
Dept: OPERATING ROOM | Age: 59
End: 2024-03-08
Payer: COMMERCIAL

## 2024-03-09 NOTE — DISCHARGE INSTRUCTIONS
Activity   You have had anesthesia today  Do not drive, operate heavy equipment, consume alcoholic beverages, or make any important decisions  for 24 hours   If you are taking pain medication: Do not drive or consume alcohol.  Take your time changing positions today. You may feel light headed or dizzy if you move too quickly.   Continue your home medications as ordered by your physician.  Avoid aspirin and over the counter medications (including vitamins, and herbal supplements) for 3 days, unless otherwise instructed by your physician.    Rest for the next 24 hours. Getting enough rest will help you recover.   No  heavy lifting or straining right after surgery.   No  strenuous activities as your doctor or other health care professional recommends. Examples of these might include weightlifting, running, or playing sports.   Use Incentive Spirometer as directed, at least 10 times an hour, when awake (if provided)  Sleep with the head of the bed elevated or sleep in a recliner  Wear your support garment at all times.   Diet   You can eat your normal diet when you feel well. You should start off with bland foods like chicken soup, toast, or yogurt. Then advance as tolerated.  Drink plenty of fluids (unless your doctor tells you not to). Your urine should be very lightly colored without a strong odor.  Medicines   If the doctor gave you a prescription medicine for pain, take it as needed as prescribed.   Care of the cut (incision)   Do not change dressing   You may reinforce dressing with gauze if needed    If you have strips of tape on the cut (incision) the doctor made, leave the tape on until it falls off.   You may cover the area with a gauze bandage if it weeps or rubs against clothing.   Keep the area clean and dry.  Call your doctor now or seek immediate medical care if:    You have pain that does not get better after you take pain medicine.    You have a fever over 101°F.    You have chills    You have signs of  infection, such as:   Increased pain, swelling, warmth, or redness.   Red streaks leading from the incision.   Pus draining from the incision.   If you do not urinate within 8 hours after surgery

## 2024-03-11 ENCOUNTER — HOSPITAL ENCOUNTER (OUTPATIENT)
Age: 59
Setting detail: OUTPATIENT SURGERY
Discharge: HOME | End: 2024-03-11
Attending: PLASTIC SURGERY | Admitting: PLASTIC SURGERY
Payer: COMMERCIAL

## 2024-03-11 ENCOUNTER — ANESTHESIA (OUTPATIENT)
Dept: OPERATING ROOM | Age: 59
End: 2024-03-11
Payer: COMMERCIAL

## 2024-03-11 VITALS
BODY MASS INDEX: 28.55 KG/M2 | WEIGHT: 188.4 LBS | TEMPERATURE: 97 F | DIASTOLIC BLOOD PRESSURE: 83 MMHG | HEIGHT: 68 IN | OXYGEN SATURATION: 98 % | HEART RATE: 94 BPM | RESPIRATION RATE: 19 BRPM | SYSTOLIC BLOOD PRESSURE: 123 MMHG

## 2024-03-11 DIAGNOSIS — T85.43XA BREAST IMPLANT RUPTURE, INITIAL ENCOUNTER: ICD-10-CM

## 2024-03-11 DIAGNOSIS — C50.012 MALIGNANT NEOPLASM OF NIPPLE OF LEFT BREAST IN FEMALE, UNSPECIFIED ESTROGEN RECEPTOR STATUS (HCC): ICD-10-CM

## 2024-03-11 DIAGNOSIS — G89.18 POST-OP PAIN: Primary | ICD-10-CM

## 2024-03-11 PROCEDURE — 6360000002 HC RX W HCPCS: Performed by: PLASTIC SURGERY

## 2024-03-11 PROCEDURE — 3600000012 HC SURGERY LEVEL 2 ADDTL 15MIN: Performed by: PLASTIC SURGERY

## 2024-03-11 PROCEDURE — 3700000001 HC ADD 15 MINUTES (ANESTHESIA): Performed by: PLASTIC SURGERY

## 2024-03-11 PROCEDURE — 2720000010 HC SURG SUPPLY STERILE: Performed by: PLASTIC SURGERY

## 2024-03-11 PROCEDURE — 2580000003 HC RX 258: Performed by: ANESTHESIOLOGY

## 2024-03-11 PROCEDURE — 2500000003 HC RX 250 WO HCPCS: Performed by: NURSE ANESTHETIST, CERTIFIED REGISTERED

## 2024-03-11 PROCEDURE — 6360000002 HC RX W HCPCS

## 2024-03-11 PROCEDURE — 88305 TISSUE EXAM BY PATHOLOGIST: CPT

## 2024-03-11 PROCEDURE — 88342 IMHCHEM/IMCYTCHM 1ST ANTB: CPT

## 2024-03-11 PROCEDURE — 7100000000 HC PACU RECOVERY - FIRST 15 MIN: Performed by: PLASTIC SURGERY

## 2024-03-11 PROCEDURE — C1789 PROSTHESIS, BREAST, IMP: HCPCS | Performed by: PLASTIC SURGERY

## 2024-03-11 PROCEDURE — 6370000000 HC RX 637 (ALT 250 FOR IP): Performed by: ANESTHESIOLOGY

## 2024-03-11 PROCEDURE — 7100000001 HC PACU RECOVERY - ADDTL 15 MIN: Performed by: PLASTIC SURGERY

## 2024-03-11 PROCEDURE — 3600000002 HC SURGERY LEVEL 2 BASE: Performed by: PLASTIC SURGERY

## 2024-03-11 PROCEDURE — 7100000011 HC PHASE II RECOVERY - ADDTL 15 MIN: Performed by: PLASTIC SURGERY

## 2024-03-11 PROCEDURE — 7100000010 HC PHASE II RECOVERY - FIRST 15 MIN: Performed by: PLASTIC SURGERY

## 2024-03-11 PROCEDURE — 6370000000 HC RX 637 (ALT 250 FOR IP)

## 2024-03-11 PROCEDURE — 3700000000 HC ANESTHESIA ATTENDED CARE: Performed by: PLASTIC SURGERY

## 2024-03-11 PROCEDURE — 6360000002 HC RX W HCPCS: Performed by: NURSE ANESTHETIST, CERTIFIED REGISTERED

## 2024-03-11 PROCEDURE — 2709999900 HC NON-CHARGEABLE SUPPLY: Performed by: PLASTIC SURGERY

## 2024-03-11 DEVICE — SMOOTH ROUND HIGH PROFILE GEL-FILLED BREAST IMPLANT, 550CC  SMOOTH ROUND SILICONE
Type: IMPLANTABLE DEVICE | Status: FUNCTIONAL
Brand: MENTOR MEMORYGEL BREAST IMPLANT

## 2024-03-11 RX ORDER — HYDRALAZINE HYDROCHLORIDE 20 MG/ML
10 INJECTION INTRAMUSCULAR; INTRAVENOUS
Status: DISCONTINUED | OUTPATIENT
Start: 2024-03-11 | End: 2024-03-11 | Stop reason: HOSPADM

## 2024-03-11 RX ORDER — CLINDAMYCIN PHOSPHATE 150 MG/ML
INJECTION, SOLUTION INTRAVENOUS PRN
Status: DISCONTINUED | OUTPATIENT
Start: 2024-03-11 | End: 2024-03-11 | Stop reason: SDUPTHER

## 2024-03-11 RX ORDER — FENTANYL CITRATE 50 UG/ML
INJECTION, SOLUTION INTRAMUSCULAR; INTRAVENOUS PRN
Status: DISCONTINUED | OUTPATIENT
Start: 2024-03-11 | End: 2024-03-11 | Stop reason: SDUPTHER

## 2024-03-11 RX ORDER — ACETAMINOPHEN 500 MG
TABLET ORAL
Status: COMPLETED
Start: 2024-03-11 | End: 2024-03-11

## 2024-03-11 RX ORDER — ACETAMINOPHEN AND CODEINE PHOSPHATE 300; 30 MG/1; MG/1
1 TABLET ORAL ONCE
Status: DISCONTINUED | OUTPATIENT
Start: 2024-03-11 | End: 2024-03-11 | Stop reason: HOSPADM

## 2024-03-11 RX ORDER — LIDOCAINE HYDROCHLORIDE 10 MG/ML
INJECTION, SOLUTION INFILTRATION; PERINEURAL PRN
Status: DISCONTINUED | OUTPATIENT
Start: 2024-03-11 | End: 2024-03-11 | Stop reason: SDUPTHER

## 2024-03-11 RX ORDER — NALOXONE HYDROCHLORIDE 0.4 MG/ML
INJECTION, SOLUTION INTRAMUSCULAR; INTRAVENOUS; SUBCUTANEOUS PRN
Status: DISCONTINUED | OUTPATIENT
Start: 2024-03-11 | End: 2024-03-11 | Stop reason: HOSPADM

## 2024-03-11 RX ORDER — ACETAMINOPHEN AND CODEINE PHOSPHATE 300; 30 MG/1; MG/1
1 TABLET ORAL EVERY 4 HOURS PRN
Qty: 42 TABLET | Refills: 0 | Status: SHIPPED | OUTPATIENT
Start: 2024-03-11 | End: 2024-03-18

## 2024-03-11 RX ORDER — SODIUM CHLORIDE 0.9 % (FLUSH) 0.9 %
5-40 SYRINGE (ML) INJECTION PRN
Status: DISCONTINUED | OUTPATIENT
Start: 2024-03-11 | End: 2024-03-11 | Stop reason: HOSPADM

## 2024-03-11 RX ORDER — ONDANSETRON 2 MG/ML
INJECTION INTRAMUSCULAR; INTRAVENOUS PRN
Status: DISCONTINUED | OUTPATIENT
Start: 2024-03-11 | End: 2024-03-11 | Stop reason: SDUPTHER

## 2024-03-11 RX ORDER — DEXAMETHASONE SODIUM PHOSPHATE 10 MG/ML
INJECTION, SOLUTION INTRAMUSCULAR; INTRAVENOUS PRN
Status: DISCONTINUED | OUTPATIENT
Start: 2024-03-11 | End: 2024-03-11 | Stop reason: SDUPTHER

## 2024-03-11 RX ORDER — SODIUM CHLORIDE, SODIUM LACTATE, POTASSIUM CHLORIDE, CALCIUM CHLORIDE 600; 310; 30; 20 MG/100ML; MG/100ML; MG/100ML; MG/100ML
INJECTION, SOLUTION INTRAVENOUS CONTINUOUS
Status: DISCONTINUED | OUTPATIENT
Start: 2024-03-11 | End: 2024-03-11 | Stop reason: HOSPADM

## 2024-03-11 RX ORDER — METOCLOPRAMIDE HYDROCHLORIDE 5 MG/ML
10 INJECTION INTRAMUSCULAR; INTRAVENOUS
Status: DISCONTINUED | OUTPATIENT
Start: 2024-03-11 | End: 2024-03-11 | Stop reason: HOSPADM

## 2024-03-11 RX ORDER — NEOSTIGMINE METHYLSULFATE 5 MG/5 ML
SYRINGE (ML) INTRAVENOUS PRN
Status: DISCONTINUED | OUTPATIENT
Start: 2024-03-11 | End: 2024-03-11 | Stop reason: SDUPTHER

## 2024-03-11 RX ORDER — OXYCODONE HYDROCHLORIDE 5 MG/1
10 TABLET ORAL PRN
Status: DISCONTINUED | OUTPATIENT
Start: 2024-03-11 | End: 2024-03-11 | Stop reason: HOSPADM

## 2024-03-11 RX ORDER — ACETAMINOPHEN AND CODEINE PHOSPHATE 300; 30 MG/1; MG/1
1 TABLET ORAL EVERY 4 HOURS PRN
Qty: 42 TABLET | Refills: 0 | Status: SHIPPED | OUTPATIENT
Start: 2024-03-11 | End: 2024-03-11 | Stop reason: HOSPADM

## 2024-03-11 RX ORDER — OXYCODONE HYDROCHLORIDE 5 MG/1
5 TABLET ORAL PRN
Status: DISCONTINUED | OUTPATIENT
Start: 2024-03-11 | End: 2024-03-11 | Stop reason: HOSPADM

## 2024-03-11 RX ORDER — MEPERIDINE HYDROCHLORIDE 50 MG/ML
12.5 INJECTION INTRAMUSCULAR; INTRAVENOUS; SUBCUTANEOUS ONCE
Status: DISCONTINUED | OUTPATIENT
Start: 2024-03-11 | End: 2024-03-11 | Stop reason: HOSPADM

## 2024-03-11 RX ORDER — GLYCOPYRROLATE 0.2 MG/ML
INJECTION INTRAMUSCULAR; INTRAVENOUS PRN
Status: DISCONTINUED | OUTPATIENT
Start: 2024-03-11 | End: 2024-03-11 | Stop reason: SDUPTHER

## 2024-03-11 RX ORDER — SODIUM CHLORIDE 9 MG/ML
INJECTION, SOLUTION INTRAVENOUS CONTINUOUS
Status: DISCONTINUED | OUTPATIENT
Start: 2024-03-11 | End: 2024-03-11 | Stop reason: HOSPADM

## 2024-03-11 RX ORDER — SCOLOPAMINE TRANSDERMAL SYSTEM 1 MG/1
1 PATCH, EXTENDED RELEASE TRANSDERMAL
Status: DISCONTINUED | OUTPATIENT
Start: 2024-03-11 | End: 2024-03-11 | Stop reason: HOSPADM

## 2024-03-11 RX ORDER — SODIUM CHLORIDE 0.9 % (FLUSH) 0.9 %
5-40 SYRINGE (ML) INJECTION EVERY 12 HOURS SCHEDULED
Status: DISCONTINUED | OUTPATIENT
Start: 2024-03-11 | End: 2024-03-11 | Stop reason: HOSPADM

## 2024-03-11 RX ORDER — MORPHINE SULFATE 2 MG/ML
INJECTION, SOLUTION INTRAMUSCULAR; INTRAVENOUS
Status: COMPLETED
Start: 2024-03-11 | End: 2024-03-11

## 2024-03-11 RX ORDER — DIPHENHYDRAMINE HYDROCHLORIDE 50 MG/ML
12.5 INJECTION INTRAMUSCULAR; INTRAVENOUS
Status: DISCONTINUED | OUTPATIENT
Start: 2024-03-11 | End: 2024-03-11 | Stop reason: HOSPADM

## 2024-03-11 RX ORDER — MIDAZOLAM HYDROCHLORIDE 1 MG/ML
INJECTION INTRAMUSCULAR; INTRAVENOUS PRN
Status: DISCONTINUED | OUTPATIENT
Start: 2024-03-11 | End: 2024-03-11 | Stop reason: SDUPTHER

## 2024-03-11 RX ORDER — SODIUM CHLORIDE 9 MG/ML
INJECTION, SOLUTION INTRAVENOUS PRN
Status: DISCONTINUED | OUTPATIENT
Start: 2024-03-11 | End: 2024-03-11 | Stop reason: HOSPADM

## 2024-03-11 RX ORDER — ROCURONIUM BROMIDE 10 MG/ML
INJECTION, SOLUTION INTRAVENOUS PRN
Status: DISCONTINUED | OUTPATIENT
Start: 2024-03-11 | End: 2024-03-11 | Stop reason: SDUPTHER

## 2024-03-11 RX ORDER — ONDANSETRON 2 MG/ML
4 INJECTION INTRAMUSCULAR; INTRAVENOUS
Status: DISCONTINUED | OUTPATIENT
Start: 2024-03-11 | End: 2024-03-11 | Stop reason: HOSPADM

## 2024-03-11 RX ORDER — LABETALOL HYDROCHLORIDE 5 MG/ML
10 INJECTION, SOLUTION INTRAVENOUS
Status: DISCONTINUED | OUTPATIENT
Start: 2024-03-11 | End: 2024-03-11 | Stop reason: HOSPADM

## 2024-03-11 RX ORDER — MORPHINE SULFATE 2 MG/ML
1 INJECTION, SOLUTION INTRAMUSCULAR; INTRAVENOUS EVERY 5 MIN PRN
Status: DISCONTINUED | OUTPATIENT
Start: 2024-03-11 | End: 2024-03-11 | Stop reason: HOSPADM

## 2024-03-11 RX ORDER — PROPOFOL 10 MG/ML
INJECTION, EMULSION INTRAVENOUS PRN
Status: DISCONTINUED | OUTPATIENT
Start: 2024-03-11 | End: 2024-03-11 | Stop reason: SDUPTHER

## 2024-03-11 RX ORDER — MIDAZOLAM HYDROCHLORIDE 2 MG/2ML
2 INJECTION, SOLUTION INTRAMUSCULAR; INTRAVENOUS
Status: DISCONTINUED | OUTPATIENT
Start: 2024-03-11 | End: 2024-03-11 | Stop reason: HOSPADM

## 2024-03-11 RX ORDER — BUPIVACAINE HYDROCHLORIDE 2.5 MG/ML
INJECTION, SOLUTION INFILTRATION; PERINEURAL PRN
Status: DISCONTINUED | OUTPATIENT
Start: 2024-03-11 | End: 2024-03-11 | Stop reason: ALTCHOICE

## 2024-03-11 RX ORDER — ACETAMINOPHEN 500 MG
500 TABLET ORAL ONCE
Status: COMPLETED | OUTPATIENT
Start: 2024-03-11 | End: 2024-03-11

## 2024-03-11 RX ADMIN — LIDOCAINE HYDROCHLORIDE 40 MG: 10 INJECTION, SOLUTION INFILTRATION; PERINEURAL at 13:27

## 2024-03-11 RX ADMIN — GLYCOPYRROLATE 0.2 MG: 0.2 INJECTION INTRAMUSCULAR; INTRAVENOUS at 15:31

## 2024-03-11 RX ADMIN — ROCURONIUM BROMIDE 20 MG: 10 SOLUTION INTRAVENOUS at 14:05

## 2024-03-11 RX ADMIN — Medication 1 MG: at 15:31

## 2024-03-11 RX ADMIN — MORPHINE SULFATE 1 MG: 2 INJECTION, SOLUTION INTRAMUSCULAR; INTRAVENOUS at 16:24

## 2024-03-11 RX ADMIN — Medication 500 MG: at 17:08

## 2024-03-11 RX ADMIN — SODIUM CHLORIDE, POTASSIUM CHLORIDE, SODIUM LACTATE AND CALCIUM CHLORIDE: 600; 310; 30; 20 INJECTION, SOLUTION INTRAVENOUS at 11:40

## 2024-03-11 RX ADMIN — PROPOFOL 200 MG: 10 INJECTION, EMULSION INTRAVENOUS at 13:27

## 2024-03-11 RX ADMIN — ACETAMINOPHEN 500 MG: 500 TABLET ORAL at 17:08

## 2024-03-11 RX ADMIN — SODIUM CHLORIDE, POTASSIUM CHLORIDE, SODIUM LACTATE AND CALCIUM CHLORIDE: 600; 310; 30; 20 INJECTION, SOLUTION INTRAVENOUS at 14:09

## 2024-03-11 RX ADMIN — CLINDAMYCIN PHOSPHATE 600 MG: 150 INJECTION, SOLUTION INTRAMUSCULAR; INTRAVENOUS at 13:32

## 2024-03-11 RX ADMIN — Medication 1 MG: at 15:32

## 2024-03-11 RX ADMIN — ROCURONIUM BROMIDE 50 MG: 10 SOLUTION INTRAVENOUS at 13:27

## 2024-03-11 RX ADMIN — MIDAZOLAM 2 MG: 1 INJECTION INTRAMUSCULAR; INTRAVENOUS at 13:24

## 2024-03-11 RX ADMIN — GLYCOPYRROLATE 0.2 MG: 0.2 INJECTION INTRAMUSCULAR; INTRAVENOUS at 15:32

## 2024-03-11 RX ADMIN — FENTANYL CITRATE 50 MCG: 50 INJECTION, SOLUTION INTRAMUSCULAR; INTRAVENOUS at 13:27

## 2024-03-11 RX ADMIN — ONDANSETRON 4 MG: 2 INJECTION INTRAMUSCULAR; INTRAVENOUS at 15:16

## 2024-03-11 RX ADMIN — DEXAMETHASONE SODIUM PHOSPHATE 10 MG: 10 INJECTION INTRAMUSCULAR; INTRAVENOUS at 13:42

## 2024-03-11 RX ADMIN — FENTANYL CITRATE 50 MCG: 50 INJECTION, SOLUTION INTRAMUSCULAR; INTRAVENOUS at 15:31

## 2024-03-11 ASSESSMENT — PAIN - FUNCTIONAL ASSESSMENT
PAIN_FUNCTIONAL_ASSESSMENT: NONE - DENIES PAIN
PAIN_FUNCTIONAL_ASSESSMENT: 0-10

## 2024-03-11 ASSESSMENT — PAIN DESCRIPTION - LOCATION
LOCATION: BREAST
LOCATION: BREAST

## 2024-03-11 ASSESSMENT — PAIN SCALES - GENERAL
PAINLEVEL_OUTOF10: 6

## 2024-03-11 ASSESSMENT — PAIN DESCRIPTION - DESCRIPTORS
DESCRIPTORS: ACHING;PRESSURE
DESCRIPTORS: PRESSURE

## 2024-03-11 ASSESSMENT — PAIN DESCRIPTION - ORIENTATION
ORIENTATION: LEFT
ORIENTATION: LEFT

## 2024-03-11 NOTE — H&P
Subjective:      Patient ID: Mones Castro is a 58 y.o. female.     HPI  Patient presents today for rupture of left breast implant. Patient had bilateral breast MRI on 12/13/23. She has a history of left breast cancer in 2015 and underwent bilateral mastectomy at that time per Dr. Durand. She did not need any additional treatment, other than the tamoxifen for 5 years. Later in the year, she was able to undergo bilateral breast reconstruction per Dr. Judge (recently relocated to [place].     Review of Systems   Constitutional: Negative.    HENT: Negative.     Respiratory: Negative.     Cardiovascular: Negative.    Gastrointestinal: Negative.    Musculoskeletal: Negative.    Neurological: Negative.       Medical History    Medical History    Diagnosis Date Comment Source   Breast cancer (HCC) 2015 age 49    Cancer (HCC) 2015, jan. breast, left    Hypothyroidism      Kidney stone      Thyroid disease         Other Medical History    Diagnosis Date Comment Source   COVID 01/2024     Hypertension      PONV (postoperative nausea and vomiting)           Family History  Pedigree Problem Relation Age of Onset Comments   Asthma Maternal Grandmother     Breast Cancer Maternal Aunt 40 age 40's at onset   Breast Cancer Paternal Aunt  late 60s   Heart Attack Maternal Grandfather     Heart Disease Father     Heart Disease Mother     Heart Disease Paternal Grandfather     Thyroid Cancer Mother 20    No known problems for Brother, Sister.     Social History    Tobacco History    Smoking Status  Never   Smokeless Tobacco Use  Never   Alcohol History    Alcohol Use Status  Not Currently Comment  social   Drug Use    Drug Use Status  No   Sexual Activity    Sexually Active  Not Asked    Surgical History    Procedure Laterality Date Comment Source   BREAST BIOPSY Right 2013 benign hyperplasia    BREAST BIOPSY Left 2015 invasive ductal carcinoma    BREAST RECONSTRUCTION  6/30/15 replace right tissue expander, revise left breast  scar    BREAST SURGERY  02/2015 bilateral mastectomy - left breast cancer    CHOLECYSTECTOMY  1990 laparoscopic    COLONOSCOPY   last one 2013    COSMETIC SURGERY  10/2015 bilateral breast reconstruction    DILATION AND CURETTAGE OF UTERUS  1994     FOOT SURGERY  1983 left foot spur    HYSTERECTOMY (CERVIX STATUS UNKNOWN) N/A 4/8/2021 ROBOTIC HYSTERECTOMY WITH BSO performed by Moises Thomas MD at [facility] OR    MASTECTOMY Bilateral 2/11/15 BILATERAL MASTECTOMIES WITH LEFT SENTINEL NODE AND BILATERAL RECONSTRUCTION WITH DERMAL AND TISSUE EXPANDERS    OVARY REMOVAL Bilateral 2021     TONSILLECTOMY   adnoids as child    UMBILICAL HERNIA REPAIR N/A 4/8/2021 OPEN UMBILICAL HERNIA REPAIR WITH MESH performed by Claire Durand MD at [facility] OR    UMBILICAL HERNIA REPAIR N/A 12/8/2022 Umbilical Exploration with Mesh Removal performed by Claire Durand MD at [facility] OR      E-cigarette/Vaping    Questions Responses   E-cigarette/Vaping Use Never User   Start Date    Passive Exposure    Quit Date    Counseling Given    Comments      Socioeconomic History    Employment History    No employment history on file.   Family and Education    Marital Status      Social Identity    Preferred Language Ethnicity Race   English Non- / Non  White (non-)           No current facility-administered medications on file prior to encounter.     Current Outpatient Medications on File Prior to Encounter   Medication Sig Dispense Refill    metoclopramide (REGLAN) 10 MG tablet Take 1 tablet by mouth once for 1 dose Take morning of surgery with SMALL SIP of water. 1 tablet 0    famotidine (PEPCID) 20 MG tablet Take 1 tablet by mouth once for 1 dose Take morning of surgery with minimal sip of water. 1 tablet 0    clindamycin (CLEOCIN) 150 MG capsule Take one PO QID. Start one day prior to surgery. 12 capsule 0    mupirocin (BACTROBAN) 2 % ointment Apply topically daily

## 2024-03-11 NOTE — ANESTHESIA POSTPROCEDURE EVALUATION
Department of Anesthesiology  Postprocedure Note    Patient: Monse Castro  MRN: [id number]  YOB: 1965  Date of evaluation: 3/11/2024    Procedure Summary       Date: 03/11/24 Room / Location: [address]    Anesthesia Start: 1324 Anesthesia Stop: 1549    Procedure: LEFT BREAST CAPSULECTOMY AND REPLACEMENT OF RUPTURED BREAST IMPLANT (Left) Diagnosis:       Breast implant rupture, initial encounter      Malignant neoplasm of nipple of left breast in female, unspecified estrogen receptor status (HCC)      (Breast implant rupture, initial encounter [T85.43XA])      (Malignant neoplasm of nipple of left breast in female, unspecified estrogen receptor status (HCC) [C50.012])    Surgeons: Roseanne Dillon MD Responsible Provider: Darcy Mullen MD    Anesthesia Type: general ASA Status: 2            Anesthesia Type: No value filed.    Irving Phase I: Irving Score: 10    Irving Phase II:      Anesthesia Post Evaluation    Patient location during evaluation: PACU  Patient participation: complete - patient participated  Level of consciousness: awake and alert  Airway patency: patent  Nausea & Vomiting: no nausea and no vomiting  Cardiovascular status: hemodynamically stable  Respiratory status: room air and spontaneous ventilation  Hydration status: euvolemic  Multimodal analgesia pain management approach  Pain management: adequate    No notable events documented.

## 2024-03-11 NOTE — ANESTHESIA PRE PROCEDURE
Department of Anesthesiology  Preprocedure Note       Name:  Monse Castro   Age:  58 y.o.  :  1965                                          MRN:  [id number]         Date:  3/11/2024      Surgeon: Surgeon(s):  Roseanne Dillon MD    Procedure: Procedure(s):  LEFT BREAST CAPSULECTOMY AND REPLACEMENT OF RUPTURED BREAST IMPLANT    Medications prior to admission:   Prior to Admission medications    Medication Sig Start Date End Date Taking? Authorizing Provider   metoclopramide (REGLAN) 10 MG tablet Take 1 tablet by mouth once for 1 dose Take morning of surgery with SMALL SIP of water. 1/30/24 3/11/24  Roseanne Dillon MD   famotidine (PEPCID) 20 MG tablet Take 1 tablet by mouth once for 1 dose Take morning of surgery with minimal sip of water. 1/30/24 3/11/24  Roseanne Dillon MD   clindamycin (CLEOCIN) 150 MG capsule Take one PO QID. Start one day prior to surgery. 24   Roseanne Dillon MD   mupirocin (BACTROBAN) 2 % ointment Apply topically daily 11/3/23   Mai Currie MD   Methylsulfonylmethane (MSM PO) Take by mouth daily    Mai Currie MD   Ascorbic Acid (VITAMIN C) 250 MG tablet Take 1 tablet by mouth daily    Mai Currie MD   OLIVE LEAF PO Take by mouth 2 in the am and 2 in the evening    Mai Currie MD   Zinc Sulfate (ZINC 15 PO) Take by mouth    Mai Currie MD   lisinopril (PRINIVIL;ZESTRIL) 10 MG tablet Take 1 tablet by mouth daily Take 2 tabs daily    Mai Currie MD   loratadine (CLARITIN) 10 MG tablet Take 1 tablet by mouth as needed    Mai Currie MD   levothyroxine (SYNTHROID) 100 MCG tablet Take 1 tablet by mouth Daily    Mai Currie MD   calcium citrate-vitamin D (CITRICAL + D) 315-250 MG-UNIT TABS Take  by mouth 2 times daily.    Mai Currie MD       Current medications:    Current Facility-Administered Medications   Medication Dose Route Frequency Provider Last Rate Last Admin    0.9

## 2024-03-12 NOTE — OP NOTE
[id number] [place] [address]                            OPERATIVE REPORT      PATIENT NAME: SHIRLEY RODRIGUEZ        : 1965  MED REC NO: [id number]                         ROOM: [facility]  ACCOUNT NO: [id number]                       ADMIT DATE: 2024  PROVIDER: Roseanne Dillon MD      DATE OF PROCEDURE:  2024    SURGEON:  Roseanne Dillon MD    PREOPERATIVE DIAGNOSIS:  Ruptured left reconstructive breast implant.    POSTOPERATIVE DIAGNOSIS:  Ruptured left reconstructive breast implant.    PROCEDURE:  Left total capsulectomy and removal of ruptured breast implant.  Replacement of the implant with a Vulcan MemoryGel smooth round implant of 550 mL volume.    ANESTHESIA:  General.    INDICATIONS:  The patient is a 58-year-old female who previously underwent bilateral mastectomy for left breast cancer and reconstruction with implants.  She presents at this time now with a left breast implant identified as ruptured for replacement.  The procedure, the risks, and benefits were discussed with her, total capsulectomy to contain the silicone.  All questions were answered to her satisfaction.  Consent was signed.    NARRATIVE SUMMARY:  The patient was brought to the operating suite, placed under general anesthetic in supine position.  She was prepped and draped in the usual sterile fashion.  Initially, using a scalpel, incision was made in the old scar transversely across the breast.  The skin and subcutaneous were then elevated up off the muscular layer until adequate for exposure.  Next, incision was made in line with the pectoralis fibers and the muscle was elevated up off the anterior implant capsule.  Dissection was continued at this plane all the way around until the capsule was only attached to the chest wall.  Then, starting superiorly and medially, the capsule was carefully dissected down from the chest

## 2024-03-15 LAB — SURGICAL PATHOLOGY REPORT: NORMAL

## 2024-03-22 ENCOUNTER — OFFICE VISIT (OUTPATIENT)
Dept: SURGERY | Age: 59
End: 2024-03-22

## 2024-03-22 VITALS
OXYGEN SATURATION: 97 % | HEIGHT: 68 IN | HEART RATE: 79 BPM | RESPIRATION RATE: 16 BRPM | SYSTOLIC BLOOD PRESSURE: 136 MMHG | DIASTOLIC BLOOD PRESSURE: 88 MMHG | BODY MASS INDEX: 29.1 KG/M2 | WEIGHT: 192 LBS

## 2024-03-22 DIAGNOSIS — T85.43XA RUPTURED LEFT BREAST IMPLANT, INITIAL ENCOUNTER: Primary | ICD-10-CM

## 2024-03-22 DIAGNOSIS — C50.912 MALIGNANT NEOPLASM OF LEFT FEMALE BREAST, UNSPECIFIED ESTROGEN RECEPTOR STATUS, UNSPECIFIED SITE OF BREAST (HCC): ICD-10-CM

## 2024-03-22 DIAGNOSIS — Z98.890 HISTORY OF RECONSTRUCTION OF BOTH BREASTS: ICD-10-CM

## 2024-03-22 PROCEDURE — 99024 POSTOP FOLLOW-UP VISIT: CPT | Performed by: PLASTIC SURGERY

## 2024-03-22 NOTE — PROGRESS NOTES
Post Op Follow Up    58 year old white female presents today for a post-op follow up of left breast capsulectomy which was completed on 3/11/24. Incision is flat, and free of excess redness, swelling, or heat.  Area free of drainage or bleeding.  Patient denies pain, but does feel like she has been having spasms. I have reviewed the patient's medical history in detail and updated the computerized patient record.

## 2024-03-22 NOTE — PROGRESS NOTES
Subjective:      Patient ID: Monse Castro is a 58 y.o. female.    HPI  Patient presents today for a post-op follow up of left breast capsulectomy and replacement of ruptured breast implant which was completed on 3/11/24. Incision is flat, and free of excess redness, swelling, or heat.  Area free of drainage or bleeding.  Patient denies pain, but does feel like she has been having spasms .    Review of Systems    Objective:   Physical Exam  Vitals and nursing note reviewed. Exam conducted with a chaperone present.   Chest:      Comments:     Wound is healing well. No infection.  Moderate residual bruising.  Breast swollen.  Occasional muscle twitch or nerve pinch.          Assessment:      S/P left breast capsulectomy and replacement of ruptured breast implant.      Plan:      OK to raise arm above head and can lift 10# with left arm.  Full activity and RTW 4-10-24.    Recheck in [place] 2 weeks.          Roseanne Dillon MD

## 2024-05-01 ENCOUNTER — NURSE ONLY (OUTPATIENT)
Dept: LAB | Age: 59
End: 2024-05-01

## 2024-05-01 LAB
ALP SERPL-CCNC: 115 U/L (ref 38–126)
ALT SERPL W/O P-5'-P-CCNC: 59 U/L (ref 11–66)
ANION GAP SERPL CALC-SCNC: 12 MEQ/L (ref 8–16)
AST SERPL-CCNC: 30 U/L (ref 5–40)
BASOPHILS ABSOLUTE: 0.1 THOU/MM3 (ref 0–0.1)
BASOPHILS NFR BLD AUTO: 1 %
BILIRUB SERPL-MCNC: 0.5 MG/DL (ref 0.3–1.2)
BUN SERPL-MCNC: 17 MG/DL (ref 7–22)
CALCIUM SERPL-MCNC: 9.7 MG/DL (ref 8.5–10.5)
CHLORIDE SERPL-SCNC: 100 MEQ/L (ref 98–111)
CO2 SERPL-SCNC: 26 MEQ/L (ref 23–33)
CREAT SERPL-MCNC: 0.8 MG/DL (ref 0.4–1.2)
DEPRECATED RDW RBC AUTO: 42 FL (ref 35–45)
EOSINOPHIL NFR BLD AUTO: 5.9 %
EOSINOPHILS ABSOLUTE: 0.4 THOU/MM3 (ref 0–0.4)
ERYTHROCYTE [DISTWIDTH] IN BLOOD BY AUTOMATED COUNT: 13.3 % (ref 11.5–14.5)
GFR SERPL CREATININE-BSD FRML MDRD: 85 ML/MIN/1.73M2
GLUCOSE SERPL-MCNC: 105 MG/DL (ref 70–108)
HCT VFR BLD AUTO: 40.8 % (ref 37–47)
HGB BLD-MCNC: 13.5 GM/DL (ref 12–16)
IMM GRANULOCYTES # BLD AUTO: 0.01 THOU/MM3 (ref 0–0.07)
IMM GRANULOCYTES NFR BLD AUTO: 0.1 %
LYMPHOCYTES ABSOLUTE: 2 THOU/MM3 (ref 1–4.8)
LYMPHOCYTES NFR BLD AUTO: 29.1 %
MCH RBC QN AUTO: 28.5 PG (ref 26–33)
MCHC RBC AUTO-ENTMCNC: 33.1 GM/DL (ref 32.2–35.5)
MCV RBC AUTO: 86.3 FL (ref 81–99)
MONOCYTES ABSOLUTE: 0.6 THOU/MM3 (ref 0.4–1.3)
MONOCYTES NFR BLD AUTO: 9.3 %
NEUTROPHILS ABSOLUTE: 3.7 THOU/MM3 (ref 1.8–7.7)
NEUTROPHILS NFR BLD AUTO: 54.6 %
NRBC BLD AUTO-RTO: 0 /100 WBC
PLATELET # BLD AUTO: 301 THOU/MM3 (ref 130–400)
PMV BLD AUTO: 10.4 FL (ref 9.4–12.4)
POTASSIUM SERPL-SCNC: 4 MEQ/L (ref 3.5–5.2)
PROT SERPL-MCNC: 7.5 G/DL (ref 6.1–8)
RBC # BLD AUTO: 4.73 MILL/MM3 (ref 4.2–5.4)
SODIUM SERPL-SCNC: 138 MEQ/L (ref 135–145)
TSH SERPL DL<=0.005 MIU/L-ACNC: 1.71 UIU/ML (ref 0.4–4.2)
WBC # BLD AUTO: 6.8 THOU/MM3 (ref 4.8–10.8)

## 2024-05-02 LAB — T4 SERPL-MCNC: 7.7 UG/DL (ref 4.5–11.7)

## 2024-05-03 LAB — CANCER AG27-29 SERPL-ACNC: 10 U/ML (ref 0–38)

## 2024-09-04 ENCOUNTER — TELEPHONE (OUTPATIENT)
Dept: SURGERY | Age: 59
End: 2024-09-04

## 2024-09-11 ENCOUNTER — OFFICE VISIT (OUTPATIENT)
Dept: SURGERY | Age: 59
End: 2024-09-11
Payer: COMMERCIAL

## 2024-09-11 VITALS
DIASTOLIC BLOOD PRESSURE: 84 MMHG | OXYGEN SATURATION: 97 % | HEART RATE: 70 BPM | BODY MASS INDEX: 28.4 KG/M2 | SYSTOLIC BLOOD PRESSURE: 132 MMHG | RESPIRATION RATE: 18 BRPM | TEMPERATURE: 97.5 F | WEIGHT: 187.4 LBS | HEIGHT: 68 IN

## 2024-09-11 DIAGNOSIS — K42.9 RECURRENT UMBILICAL HERNIA: Primary | ICD-10-CM

## 2024-09-11 DIAGNOSIS — I10 ESSENTIAL HYPERTENSION: ICD-10-CM

## 2024-09-11 DIAGNOSIS — Z01.818 PRE-OP TESTING: ICD-10-CM

## 2024-09-11 PROCEDURE — 3075F SYST BP GE 130 - 139MM HG: CPT | Performed by: SURGERY

## 2024-09-11 PROCEDURE — 99214 OFFICE O/P EST MOD 30 MIN: CPT | Performed by: SURGERY

## 2024-09-11 PROCEDURE — 3079F DIAST BP 80-89 MM HG: CPT | Performed by: SURGERY

## 2024-09-19 ENCOUNTER — LAB (OUTPATIENT)
Dept: LAB | Age: 59
End: 2024-09-19

## 2024-09-19 DIAGNOSIS — Z01.818 PRE-OP TESTING: ICD-10-CM

## 2024-09-19 DIAGNOSIS — I10 ESSENTIAL HYPERTENSION: ICD-10-CM

## 2024-09-19 DIAGNOSIS — K42.9 RECURRENT UMBILICAL HERNIA: ICD-10-CM

## 2024-09-19 LAB
ANION GAP SERPL CALC-SCNC: 13 MEQ/L (ref 8–16)
BUN SERPL-MCNC: 17 MG/DL (ref 7–22)
CALCIUM SERPL-MCNC: 9.4 MG/DL (ref 8.5–10.5)
CHLORIDE SERPL-SCNC: 99 MEQ/L (ref 98–111)
CO2 SERPL-SCNC: 27 MEQ/L (ref 23–33)
CREAT SERPL-MCNC: 0.7 MG/DL (ref 0.4–1.2)
GFR SERPL CREATININE-BSD FRML MDRD: > 90 ML/MIN/1.73M2
GLUCOSE SERPL-MCNC: 94 MG/DL (ref 70–108)
HCT VFR BLD AUTO: 39.4 % (ref 37–47)
HGB BLD-MCNC: 12.8 GM/DL (ref 12–16)
POTASSIUM SERPL-SCNC: 4 MEQ/L (ref 3.5–5.2)
SODIUM SERPL-SCNC: 139 MEQ/L (ref 135–145)

## 2024-10-04 NOTE — H&P
[facility]  1965[facility]      ASSESSMENT:  1.  Recurrent umbilical hernia  2.  Chronic periumbilical wound with possible chronic mesh infection     PLAN:  1. Schedule Monse for robotic and open repair recurrent umbilical hernia with possible biodegradable mesh insertion; possible extraction any residual old periumbilical foreign body/mesh.  2. She will undergo pre-operative clearance per anesthesia guidelines with risk factors listed under the past medical history diagnosis & problem list.  3. The risks, benefits and alternatives were discussed with Monse including non-operative management.  The pros and cons of robotic, laparoscopic and open techniques were discussed.  The pros and cons of mesh insertion were discussed.  All questions answered. She understands and wishes to proceed with surgical intervention.  4. Restrictions discussed with Monse and she expresses understanding.  5. She is advised to call back directly if there are further questions/concerns, or if her symptoms worsen prior to surgery.        SUBJECTIVE/OBJECTIVE:          Chief Complaint   Patient presents with    Surgical Consult       Est pt of Dr. Durand's self referral 2nd opinion - Umbilical wound, drainage S/p umbilical exploration, debridement explantation old ventralex mesh 22      JENNY Shea is a 58-year-old female who presents for initial evaluation secondary to concern for recurrent umbilical hernia as well as a chronic intermittent draining wound at the periumbilical region on the side of her old scar.  She originally underwent a incarcerated periumbilical/incisional hernia repair with mesh insertion 2021.  This was followed by umbilical exploration with debridement and removal of  old Ventralex mesh 2022.  She has had her wound opened up again.  Has a small bulge in this area as well.  Intermittent drainage.  Mild discomfort.  No severe pain.  She states this started again in January

## 2024-10-05 ENCOUNTER — PREP FOR PROCEDURE (OUTPATIENT)
Dept: SURGERY | Age: 59
End: 2024-10-05

## 2024-10-05 RX ORDER — SODIUM CHLORIDE 0.9 % (FLUSH) 0.9 %
5-40 SYRINGE (ML) INJECTION EVERY 12 HOURS SCHEDULED
Status: CANCELLED | OUTPATIENT
Start: 2024-10-05

## 2024-10-05 RX ORDER — SODIUM CHLORIDE 0.9 % (FLUSH) 0.9 %
5-40 SYRINGE (ML) INJECTION PRN
Status: CANCELLED | OUTPATIENT
Start: 2024-10-05

## 2024-10-05 RX ORDER — SODIUM CHLORIDE 9 MG/ML
INJECTION, SOLUTION INTRAVENOUS PRN
Status: CANCELLED | OUTPATIENT
Start: 2024-10-05

## 2024-10-05 RX ORDER — CIPROFLOXACIN 2 MG/ML
400 INJECTION, SOLUTION INTRAVENOUS
Status: CANCELLED | OUTPATIENT
Start: 2024-10-05 | End: 2024-10-05

## 2024-10-05 RX ORDER — SODIUM CHLORIDE 9 MG/ML
INJECTION, SOLUTION INTRAVENOUS CONTINUOUS
Status: CANCELLED | OUTPATIENT
Start: 2024-10-05

## 2024-10-08 ENCOUNTER — HOSPITAL ENCOUNTER (OUTPATIENT)
Age: 59
Setting detail: OUTPATIENT SURGERY
Discharge: HOME | End: 2024-10-08
Attending: SURGERY | Admitting: SURGERY
Payer: COMMERCIAL

## 2024-10-08 ENCOUNTER — ANESTHESIA EVENT (OUTPATIENT)
Dept: OPERATING ROOM | Age: 59
End: 2024-10-08
Payer: COMMERCIAL

## 2024-10-08 ENCOUNTER — ANESTHESIA (OUTPATIENT)
Dept: OPERATING ROOM | Age: 59
End: 2024-10-08
Payer: COMMERCIAL

## 2024-10-08 VITALS
WEIGHT: 187.2 LBS | DIASTOLIC BLOOD PRESSURE: 69 MMHG | RESPIRATION RATE: 18 BRPM | SYSTOLIC BLOOD PRESSURE: 125 MMHG | OXYGEN SATURATION: 96 % | HEART RATE: 80 BPM | HEIGHT: 68 IN | BODY MASS INDEX: 28.37 KG/M2 | TEMPERATURE: 97.3 F

## 2024-10-08 DIAGNOSIS — K42.9 RECURRENT UMBILICAL HERNIA: ICD-10-CM

## 2024-10-08 LAB — FUNGUS SPEC FUNGUS STN: NORMAL

## 2024-10-08 PROCEDURE — 2580000003 HC RX 258: Performed by: NURSE PRACTITIONER

## 2024-10-08 PROCEDURE — 87070 CULTURE OTHR SPECIMN AEROBIC: CPT

## 2024-10-08 PROCEDURE — 7100000010 HC PHASE II RECOVERY - FIRST 15 MIN: Performed by: SURGERY

## 2024-10-08 PROCEDURE — 2709999900 HC NON-CHARGEABLE SUPPLY: Performed by: SURGERY

## 2024-10-08 PROCEDURE — 2580000003 HC RX 258: Performed by: SURGERY

## 2024-10-08 PROCEDURE — 3700000000 HC ANESTHESIA ATTENDED CARE: Performed by: SURGERY

## 2024-10-08 PROCEDURE — 3600000013 HC SURGERY LEVEL 3 ADDTL 15MIN: Performed by: SURGERY

## 2024-10-08 PROCEDURE — 3600000003 HC SURGERY LEVEL 3 BASE: Performed by: SURGERY

## 2024-10-08 PROCEDURE — 2500000003 HC RX 250 WO HCPCS

## 2024-10-08 PROCEDURE — 87205 SMEAR GRAM STAIN: CPT

## 2024-10-08 PROCEDURE — 11043 DBRDMT MUSC&/FSCA 1ST 20/<: CPT | Performed by: SURGERY

## 2024-10-08 PROCEDURE — 49616 RPR AA HRN RCR 3-10 NCR/STRN: CPT | Performed by: SURGERY

## 2024-10-08 PROCEDURE — 6360000002 HC RX W HCPCS: Performed by: SURGERY

## 2024-10-08 PROCEDURE — 7100000000 HC PACU RECOVERY - FIRST 15 MIN: Performed by: SURGERY

## 2024-10-08 PROCEDURE — 6370000000 HC RX 637 (ALT 250 FOR IP)

## 2024-10-08 PROCEDURE — 7100000001 HC PACU RECOVERY - ADDTL 15 MIN: Performed by: SURGERY

## 2024-10-08 PROCEDURE — 6360000002 HC RX W HCPCS

## 2024-10-08 PROCEDURE — 3700000001 HC ADD 15 MINUTES (ANESTHESIA): Performed by: SURGERY

## 2024-10-08 PROCEDURE — 7100000011 HC PHASE II RECOVERY - ADDTL 15 MIN: Performed by: SURGERY

## 2024-10-08 PROCEDURE — 87075 CULTR BACTERIA EXCEPT BLOOD: CPT

## 2024-10-08 PROCEDURE — 87102 FUNGUS ISOLATION CULTURE: CPT

## 2024-10-08 PROCEDURE — 6360000002 HC RX W HCPCS: Performed by: NURSE PRACTITIONER

## 2024-10-08 PROCEDURE — 6360000002 HC RX W HCPCS: Performed by: ANESTHESIOLOGY

## 2024-10-08 RX ORDER — KETOROLAC TROMETHAMINE 30 MG/ML
INJECTION, SOLUTION INTRAMUSCULAR; INTRAVENOUS
Status: COMPLETED
Start: 2024-10-08 | End: 2024-10-08

## 2024-10-08 RX ORDER — SODIUM CHLORIDE 0.9 % (FLUSH) 0.9 %
5-40 SYRINGE (ML) INJECTION EVERY 12 HOURS SCHEDULED
Status: DISCONTINUED | OUTPATIENT
Start: 2024-10-08 | End: 2024-10-08 | Stop reason: HOSPADM

## 2024-10-08 RX ORDER — ONDANSETRON 2 MG/ML
4 INJECTION INTRAMUSCULAR; INTRAVENOUS EVERY 6 HOURS PRN
Status: CANCELLED | OUTPATIENT
Start: 2024-10-08

## 2024-10-08 RX ORDER — MIDAZOLAM HYDROCHLORIDE 1 MG/ML
INJECTION INTRAMUSCULAR; INTRAVENOUS
Status: DISCONTINUED | OUTPATIENT
Start: 2024-10-08 | End: 2024-10-08 | Stop reason: SDUPTHER

## 2024-10-08 RX ORDER — SCOLOPAMINE TRANSDERMAL SYSTEM 1 MG/1
PATCH, EXTENDED RELEASE TRANSDERMAL
Status: DISCONTINUED
Start: 2024-10-08 | End: 2024-10-08 | Stop reason: HOSPADM

## 2024-10-08 RX ORDER — SODIUM CHLORIDE 9 MG/ML
INJECTION, SOLUTION INTRAVENOUS PRN
Status: DISCONTINUED | OUTPATIENT
Start: 2024-10-08 | End: 2024-10-08 | Stop reason: HOSPADM

## 2024-10-08 RX ORDER — SODIUM CHLORIDE 9 MG/ML
INJECTION, SOLUTION INTRAVENOUS PRN
Status: CANCELLED | OUTPATIENT
Start: 2024-10-08

## 2024-10-08 RX ORDER — OXYCODONE HYDROCHLORIDE 5 MG/1
5 TABLET ORAL EVERY 4 HOURS PRN
Qty: 20 TABLET | Refills: 0 | Status: SHIPPED | OUTPATIENT
Start: 2024-10-08 | End: 2024-10-14

## 2024-10-08 RX ORDER — DEXAMETHASONE SODIUM PHOSPHATE 10 MG/ML
INJECTION, EMULSION INTRAMUSCULAR; INTRAVENOUS
Status: DISCONTINUED | OUTPATIENT
Start: 2024-10-08 | End: 2024-10-08 | Stop reason: SDUPTHER

## 2024-10-08 RX ORDER — OXYCODONE HYDROCHLORIDE 5 MG/1
5 TABLET ORAL EVERY 4 HOURS PRN
Status: DISCONTINUED | OUTPATIENT
Start: 2024-10-08 | End: 2024-10-08 | Stop reason: HOSPADM

## 2024-10-08 RX ORDER — SODIUM CHLORIDE 0.9 % (FLUSH) 0.9 %
5-40 SYRINGE (ML) INJECTION EVERY 12 HOURS SCHEDULED
Status: CANCELLED | OUTPATIENT
Start: 2024-10-08

## 2024-10-08 RX ORDER — SUCCINYLCHOLINE/SOD CL,ISO/PF 200MG/10ML
SYRINGE (ML) INTRAVENOUS
Status: DISCONTINUED | OUTPATIENT
Start: 2024-10-08 | End: 2024-10-08 | Stop reason: SDUPTHER

## 2024-10-08 RX ORDER — SCOLOPAMINE TRANSDERMAL SYSTEM 1 MG/1
1 PATCH, EXTENDED RELEASE TRANSDERMAL
Status: DISCONTINUED | OUTPATIENT
Start: 2024-10-08 | End: 2024-10-08 | Stop reason: HOSPADM

## 2024-10-08 RX ORDER — KETOROLAC TROMETHAMINE 30 MG/ML
30 INJECTION, SOLUTION INTRAMUSCULAR; INTRAVENOUS ONCE
Status: COMPLETED | OUTPATIENT
Start: 2024-10-08 | End: 2024-10-08

## 2024-10-08 RX ORDER — OXYCODONE HYDROCHLORIDE 5 MG/1
10 TABLET ORAL EVERY 4 HOURS PRN
Status: DISCONTINUED | OUTPATIENT
Start: 2024-10-08 | End: 2024-10-08 | Stop reason: HOSPADM

## 2024-10-08 RX ORDER — FENTANYL CITRATE 50 UG/ML
INJECTION, SOLUTION INTRAMUSCULAR; INTRAVENOUS
Status: DISCONTINUED | OUTPATIENT
Start: 2024-10-08 | End: 2024-10-08 | Stop reason: SDUPTHER

## 2024-10-08 RX ORDER — PROPOFOL 10 MG/ML
INJECTION, EMULSION INTRAVENOUS
Status: DISCONTINUED | OUTPATIENT
Start: 2024-10-08 | End: 2024-10-08 | Stop reason: SDUPTHER

## 2024-10-08 RX ORDER — SODIUM CHLORIDE 0.9 % (FLUSH) 0.9 %
5-40 SYRINGE (ML) INJECTION PRN
Status: CANCELLED | OUTPATIENT
Start: 2024-10-08

## 2024-10-08 RX ORDER — MORPHINE SULFATE 2 MG/ML
2 INJECTION, SOLUTION INTRAMUSCULAR; INTRAVENOUS
Status: CANCELLED | OUTPATIENT
Start: 2024-10-08

## 2024-10-08 RX ORDER — FENTANYL CITRATE 50 UG/ML
50 INJECTION, SOLUTION INTRAMUSCULAR; INTRAVENOUS EVERY 5 MIN PRN
Status: COMPLETED | OUTPATIENT
Start: 2024-10-08 | End: 2024-10-08

## 2024-10-08 RX ORDER — ONDANSETRON 2 MG/ML
INJECTION INTRAMUSCULAR; INTRAVENOUS
Status: DISCONTINUED | OUTPATIENT
Start: 2024-10-08 | End: 2024-10-08 | Stop reason: SDUPTHER

## 2024-10-08 RX ORDER — SODIUM CHLORIDE 9 MG/ML
INJECTION, SOLUTION INTRAVENOUS CONTINUOUS
Status: DISCONTINUED | OUTPATIENT
Start: 2024-10-08 | End: 2024-10-08 | Stop reason: HOSPADM

## 2024-10-08 RX ORDER — NALOXONE HYDROCHLORIDE 0.4 MG/ML
INJECTION, SOLUTION INTRAMUSCULAR; INTRAVENOUS; SUBCUTANEOUS PRN
Status: DISCONTINUED | OUTPATIENT
Start: 2024-10-08 | End: 2024-10-08 | Stop reason: HOSPADM

## 2024-10-08 RX ORDER — ACETAMINOPHEN 325 MG/1
650 TABLET ORAL EVERY 4 HOURS PRN
Status: CANCELLED | OUTPATIENT
Start: 2024-10-08

## 2024-10-08 RX ORDER — ROCURONIUM BROMIDE 10 MG/ML
INJECTION, SOLUTION INTRAVENOUS
Status: DISCONTINUED | OUTPATIENT
Start: 2024-10-08 | End: 2024-10-08 | Stop reason: SDUPTHER

## 2024-10-08 RX ORDER — ONDANSETRON 4 MG/1
4 TABLET, ORALLY DISINTEGRATING ORAL EVERY 8 HOURS PRN
Status: CANCELLED | OUTPATIENT
Start: 2024-10-08

## 2024-10-08 RX ORDER — KETOROLAC TROMETHAMINE 10 MG/1
10 TABLET, FILM COATED ORAL EVERY 8 HOURS PRN
Qty: 15 TABLET | Refills: 0 | Status: SHIPPED | OUTPATIENT
Start: 2024-10-08

## 2024-10-08 RX ORDER — MORPHINE SULFATE 4 MG/ML
4 INJECTION, SOLUTION INTRAMUSCULAR; INTRAVENOUS
Status: CANCELLED | OUTPATIENT
Start: 2024-10-08

## 2024-10-08 RX ORDER — LIDOCAINE HCL/PF 100 MG/5ML
SYRINGE (ML) INJECTION
Status: DISCONTINUED | OUTPATIENT
Start: 2024-10-08 | End: 2024-10-08 | Stop reason: SDUPTHER

## 2024-10-08 RX ORDER — ONDANSETRON 2 MG/ML
4 INJECTION INTRAMUSCULAR; INTRAVENOUS
Status: DISCONTINUED | OUTPATIENT
Start: 2024-10-08 | End: 2024-10-08 | Stop reason: HOSPADM

## 2024-10-08 RX ORDER — FAMOTIDINE 20 MG/1
20 TABLET, FILM COATED ORAL 2 TIMES DAILY
Status: CANCELLED | OUTPATIENT
Start: 2024-10-08

## 2024-10-08 RX ORDER — SODIUM CHLORIDE 0.9 % (FLUSH) 0.9 %
5-40 SYRINGE (ML) INJECTION PRN
Status: DISCONTINUED | OUTPATIENT
Start: 2024-10-08 | End: 2024-10-08 | Stop reason: HOSPADM

## 2024-10-08 RX ORDER — FENTANYL CITRATE 50 UG/ML
25 INJECTION, SOLUTION INTRAMUSCULAR; INTRAVENOUS EVERY 5 MIN PRN
Status: DISCONTINUED | OUTPATIENT
Start: 2024-10-08 | End: 2024-10-08 | Stop reason: HOSPADM

## 2024-10-08 RX ORDER — MEPERIDINE HYDROCHLORIDE 25 MG/ML
12.5 INJECTION INTRAMUSCULAR; INTRAVENOUS; SUBCUTANEOUS EVERY 5 MIN PRN
Status: DISCONTINUED | OUTPATIENT
Start: 2024-10-08 | End: 2024-10-08 | Stop reason: HOSPADM

## 2024-10-08 RX ADMIN — FENTANYL CITRATE 50 MCG: 50 INJECTION, SOLUTION INTRAMUSCULAR; INTRAVENOUS at 14:47

## 2024-10-08 RX ADMIN — WATER 2000 MG: 1 INJECTION INTRAMUSCULAR; INTRAVENOUS; SUBCUTANEOUS at 13:30

## 2024-10-08 RX ADMIN — SODIUM CHLORIDE: 9 INJECTION, SOLUTION INTRAVENOUS at 11:22

## 2024-10-08 RX ADMIN — Medication 100 MG: at 13:22

## 2024-10-08 RX ADMIN — FENTANYL CITRATE 50 MCG: 50 INJECTION, SOLUTION INTRAMUSCULAR; INTRAVENOUS at 14:42

## 2024-10-08 RX ADMIN — FENTANYL CITRATE 100 MCG: 50 INJECTION, SOLUTION INTRAMUSCULAR; INTRAVENOUS at 13:22

## 2024-10-08 RX ADMIN — MIDAZOLAM 2 MG: 1 INJECTION INTRAMUSCULAR; INTRAVENOUS at 13:16

## 2024-10-08 RX ADMIN — SUGAMMADEX 200 MG: 100 INJECTION, SOLUTION INTRAVENOUS at 14:16

## 2024-10-08 RX ADMIN — ROCURONIUM BROMIDE 50 MG: 10 INJECTION INTRAVENOUS at 13:35

## 2024-10-08 RX ADMIN — FENTANYL CITRATE 50 MCG: 50 INJECTION, SOLUTION INTRAMUSCULAR; INTRAVENOUS at 14:21

## 2024-10-08 RX ADMIN — ONDANSETRON 4 MG: 2 INJECTION INTRAMUSCULAR; INTRAVENOUS at 14:04

## 2024-10-08 RX ADMIN — DEXAMETHASONE SODIUM PHOSPHATE 10 MG: 10 INJECTION, EMULSION INTRAMUSCULAR; INTRAVENOUS at 13:28

## 2024-10-08 RX ADMIN — Medication 160 MG: at 13:22

## 2024-10-08 RX ADMIN — KETOROLAC TROMETHAMINE 30 MG: 30 INJECTION, SOLUTION INTRAMUSCULAR at 14:27

## 2024-10-08 RX ADMIN — FENTANYL CITRATE 50 MCG: 50 INJECTION, SOLUTION INTRAMUSCULAR; INTRAVENOUS at 13:55

## 2024-10-08 RX ADMIN — PROPOFOL 200 MG: 10 INJECTION, EMULSION INTRAVENOUS at 13:22

## 2024-10-08 RX ADMIN — KETOROLAC TROMETHAMINE 30 MG: 30 INJECTION, SOLUTION INTRAMUSCULAR; INTRAVENOUS at 14:27

## 2024-10-08 ASSESSMENT — PAIN DESCRIPTION - PAIN TYPE
TYPE: SURGICAL PAIN
TYPE: SURGICAL PAIN

## 2024-10-08 ASSESSMENT — PAIN DESCRIPTION - DESCRIPTORS
DESCRIPTORS: ACHING
DESCRIPTORS: BURNING

## 2024-10-08 ASSESSMENT — PAIN - FUNCTIONAL ASSESSMENT
PAIN_FUNCTIONAL_ASSESSMENT: 0-10
PAIN_FUNCTIONAL_ASSESSMENT: ACTIVITIES ARE NOT PREVENTED

## 2024-10-08 ASSESSMENT — PAIN SCALES - GENERAL
PAINLEVEL_OUTOF10: 5
PAINLEVEL_OUTOF10: 7

## 2024-10-08 ASSESSMENT — PAIN DESCRIPTION - LOCATION
LOCATION: ABDOMEN
LOCATION: ABDOMEN

## 2024-10-08 NOTE — BRIEF OP NOTE
Brief Postoperative Note      Patient: Monse MYERS Grothouse  YOB: 1965  MRN: [id number]    Date of Procedure: 10/8/2024    Preoperative diagnosis:  1.  Recurrent umbilical hernia  2.  Chronic periumbilical wound with possible chronic mesh infection    Post-Op Diagnosis: Same       Procedure:   1.  Excisional debridement skin, subcutaneous tissue and muscle/fascia chronic nonhealing and draining abdominal wall wound (2 x 4 cm)  2.  Primary repair recurrent incarcerated umbilical hernia (3.5 cm defect)    Surgeon(s):  Waldemar Jackson MD    Assistant:  First Assistant: Jyoti Wagner    Anesthesia: General/local    Estimated Blood Loss (mL): 10ml    Complications: None    Specimens:   ID Type Source Tests Collected by Time Destination   1 : abdominal wall tissue Tissue Abdomen CULTURE, FUNGUS, GRAM STAIN (Canceled), CULTURE, ANAEROBIC AND AEROBIC Waldemar Jackson MD 10/8/2024 1345        Implants:  * No implants in log *      Drains: * No LDAs found *    Findings:  Infection Present At Time Of Surgery (PATOS) (choose all levels that have infection present):  - Superficial Infection (skin/subcutaneous) present as evidenced by purulent fluid    Other Findings: see op note    Electronically signed by Waldemar Jackson MD on 10/8/2024 at 2:07 PM

## 2024-10-08 NOTE — PROGRESS NOTES

## 2024-10-08 NOTE — PROGRESS NOTES
Pt returned to [facility] room 14. Vitals and assessment as charted. 0.9 infusing, to count from PACU. Pt has crackers and water. Family at the bedside. Pt and family verbalized understanding of discharge criteria and call light use. Call light in reach.

## 2024-10-08 NOTE — PROGRESS NOTES
Patient admitted to [facility] room 14 with  at bedside. Bed in low position side rails up call light in reach. Patient denies questions at this time.

## 2024-10-08 NOTE — ANESTHESIA PRE PROCEDURE
09/19/2024 03:17 PM    K 4.0 09/19/2024 03:17 PM    CL 99 09/19/2024 03:17 PM    CO2 27 09/19/2024 03:17 PM    BUN 17 09/19/2024 03:17 PM    CREATININE 0.7 09/19/2024 03:17 PM    LABGLOM > 90 09/19/2024 03:17 PM    LABGLOM >60 01/03/2024 07:07 AM    GLUCOSE 94 09/19/2024 03:17 PM    CALCIUM 9.4 09/19/2024 03:17 PM    BILITOT 0.5 05/01/2024 03:09 PM    ALKPHOS 115 05/01/2024 03:09 PM    AST 30 05/01/2024 03:09 PM    ALT 59 05/01/2024 03:09 PM       POC Tests: No results for input(s): \"POCGLU\", \"POCNA\", \"POCK\", \"POCCL\", \"POCBUN\", \"POCHEMO\", \"POCHCT\" in the last 72 hours.    Coags: No results found for: \"PROTIME\", \"INR\", \"APTT\"    HCG (If Applicable):   Lab Results   Component Value Date    PREGTESTUR negative 04/08/2021    PREGSERUM NEGATIVE 01/31/2015        ABGs: No results found for: \"PHART\", \"PO2ART\", \"DZJ9YQA\", \"IOK0XRE\", \"BEART\", \"W0QRVAQJ\"     Type & Screen (If Applicable):  No results found for: \"ABORH\", \"LABANTI\"    Drug/Infectious Status (If Applicable):  No results found for: \"HIV\", \"HEPCAB\"    COVID-19 Screening (If Applicable):   Lab Results   Component Value Date/Time    COVID19 DETECTED 01/03/2024 06:49 AM           Anesthesia Evaluation  Patient summary reviewed and Nursing notes reviewed   history of anesthetic complications: PONV.  Airway: Mallampati: II  TM distance: >3 FB   Neck ROM: full  Mouth opening: > = 3 FB   Dental:          Pulmonary:Negative Pulmonary ROS and normal exam  breath sounds clear to auscultation                             Cardiovascular:  Exercise tolerance: good (>4 METS)  (+) hypertension:      ECG reviewed                        Neuro/Psych:   Negative Neuro/Psych ROS              GI/Hepatic/Renal: Neg GI/Hepatic/Renal ROS            Endo/Other:    (+) hypothyroidism::., malignancy/cancer.          Pt had no PAT visit       Abdominal: normal exam            Vascular: negative vascular ROS.         Other Findings:       Anesthesia Plan      general     ASA 3       Induction:

## 2024-10-08 NOTE — PROGRESS NOTES
1423: pt arrives to pacu, respirations unlabored on room air. Pt awake and responding to commands. Medicated by crna  1426: placed on 2L nasal cannula.  1427: medicated with 30 mg Toradol  1442: medicated with 50 mcg fentanyl  1447: medicated with 50 mcg fentanyl  1455: pt resting comfortably in bed, no signs of distress noted.   1507: pt meets criteria for discharge from pacu at this time. pt transported to [facility] in stable condition.  1508: Report given to Quiana MANCIA

## 2024-10-08 NOTE — DISCHARGE INSTRUCTIONS
DR. EVANS'S DISCHARGE INSTRUCTIONS    Pt Name: Monse MYERS [facility]  Medical Record Number: [id number]  Today's Date: 10/8/2024    GENERAL ANESTHESIA OR SEDATION  1. Do not drive or operate hazardous machinery for 24 hours.  2. Do not make important business or personal decisions for 24 hours.  3. Do not drink alcoholic beverages or use tobacco for 24 hours.    ACTIVITY INSTRUCTIONS:  [] Rest today. Resume light to normal activity tomorrow.   [x] You may resume normal activity tomorrow. Do not engage in strenuous activity that may place stress on your incision.  [x] Do not drive for 3-5 days and avoid heavy lifting, tugging, pullings greater than 10-20 lbs until seen in the office.      DIET INSTRUCTIONS:  [x]Begin with clear liquids. If not nauseated, may increase to a low-fat diet when you desire. Greasy and spicy foods are not advised.  [x]Regular diet as tolerated.  []Other:     MEDICATIONS  [x]Prescription sent with you to be used as directed.   []Valium   [x]Norco   []Percocet   [x]Toradol   []Oxycontin     Do not drink alcohol or drive while taking these medications. You may experience dizziness or drowsiness with these medications. You may also experience constipation which can be relieved with stool softners or laxatives.    - Take Colace 100 mg 1-2 tabs daily as needed for constipation  - Take MiraLax 1-2 cap fulls daily as needed for constipation    [x]You may resume your daily prescription medication schedule unless otherwise specified.  []Do not take 325mg Aspirin or other blood thinners such as Coumadin or Plavix for 5 days.    **Pain medication at discharge - use only as prescribed- refills may be available to you at your follow up appointments if needed and warranted.  Narcotics should be used for only short term and we highly encouraged our patients to wean off appropriately and use other means for pain such as non pharmacologic measures and over the counter tylenol or ibuprofen if no

## 2024-10-08 NOTE — INTERVAL H&P NOTE
Update History & Physical    The patient's History and Physical was reviewed with the patient and I examined the patient. There was no change. The surgical site was confirmed by the patient and me.     Plan: The risks, benefits, expected outcome, and alternative to the recommended procedure have been discussed with the patient. Patient understands and wants to proceed with the procedure.     Electronically signed by Waldemar Jackson MD on 10/8/2024 at 10:36 AM

## 2024-10-08 NOTE — ANESTHESIA POSTPROCEDURE EVALUATION
Department of Anesthesiology  Postprocedure Note    Patient: Monse Castro  MRN: [id number]  YOB: 1965  Date of evaluation: 10/8/2024    Procedure Summary       Date: 10/08/24 Room / Location: [facility] OR 08 / [facility] OR    Anesthesia Start: 1315 Anesthesia Stop: 1435    Procedure: open RECURRENT UMBILICAL HERNIA REPAIR WITH MESH and removal of old periumbilical mesh (Abdomen) Diagnosis:       Recurrent umbilical hernia      (Recurrent umbilical hernia [K42.9])    Surgeons: Waldemar Jackson MD Responsible Provider: Иван Mckeon DO    Anesthesia Type: general ASA Status: 3            Anesthesia Type: No value filed.    Irving Phase I: Irving Score: 9    Irving Phase II: Irving Score: 10    Anesthesia Post Evaluation    Patient location during evaluation: PACU  Patient participation: complete - patient participated  Level of consciousness: awake and alert  Pain score: 4  Airway patency: patent  Nausea & Vomiting: no nausea and no vomiting  Cardiovascular status: hemodynamically stable and blood pressure returned to baseline  Respiratory status: spontaneous ventilation, acceptable and nasal cannula  Hydration status: stable  Pain management: adequate and satisfactory to patient    No notable events documented.

## 2024-10-09 ENCOUNTER — TELEPHONE (OUTPATIENT)
Dept: SURGERY | Age: 59
End: 2024-10-09

## 2024-10-09 NOTE — OP NOTE
[address] [address]                            OPERATIVE REPORT      PATIENT NAME: SHIRLEY RODRIGUEZ        : 1965  MED REC NO: [id number]                       ROOM: [facility]                                               (General) POOL                                               RM    ACCOUNT NO: [id number]                       ADMIT DATE: 10/08/2024  PROVIDER: Waldemar Jackson MD      DATE OF PROCEDURE:  10/08/2024    SURGEON:  Waldemar Jackson MD    PREOPERATIVE DIAGNOSES:    1. Recurrent umbilical hernia.  2. Chronic periumbilical wound with possible chronic infected mesh/stitch.    PREOPERATIVE DIAGNOSES:    1. Recurrent umbilical hernia.  2. Chronic periumbilical wound with possible chronic infected mesh/stitch.    PROCEDURES:    1.  Excisional debridement of skin, subcutaneous tissue, and muscle/fascia, chronic nonhealing and draining abdominal wall wound (2 x 4 cm).  2. Primary repair of recurrent incarcerated umbilical hernia (3.5 cm defect).    ASSISTANT:  Jyoti Wagner.    ANESTHESIA:  General/local.    ESTIMATED BLOOD LOSS:  10 mL.    DRAINS:  None.    COMPLICATIONS:  None.    DISPOSITION:  Stable to the recovery room.    INDICATIONS:  The patient is a 58-year-old female who presents with a recurrent umbilical hernia and chronic draining sinus x2 at the periumbilical region.  She had a hernia repaired in the past and then this eventually had to be debrided with mesh removal.  She still having draining sinuses.  Both operative and nonoperative intervention plans were discussed.  Risks of surgery were further discussed.  Some of the risks included, but were not limited to, bleeding, infection, the need for reoperation, severe chronic postoperative pain or numbness, major vascular or nerve injury, cardiopulmonary complications, anesthetic complications, seroma or hematoma formation, wound breakdown, failure of

## 2024-10-09 NOTE — TELEPHONE ENCOUNTER
I left msg to see how pt is doing s/p excisional debridement of skin, subcutaneous tissue, and muscle/fascia, chronic nonhealing and draining abdominal wall wound (2 x 4 cm), primary repair of recurrent incarcerated umbilical hernia (3.5 cm defect) on 10/08/24?

## 2024-10-09 NOTE — TELEPHONE ENCOUNTER
Pt called back and states she is doing fine.  She is taking it easy.  She is using ice and taking it easy.  She will call if she has any issues.

## 2024-10-13 LAB
BACTERIA SPEC AEROBE CULT: NORMAL
BACTERIA SPEC ANAEROBE CULT: NORMAL
GRAM STN SPEC: NORMAL

## 2024-10-14 ENCOUNTER — TELEPHONE (OUTPATIENT)
Dept: SURGERY | Age: 59
End: 2024-10-14

## 2024-10-14 LAB
FUNGUS SPEC CULT: NORMAL
FUNGUS SPEC FUNGUS STN: NORMAL

## 2024-10-14 NOTE — TELEPHONE ENCOUNTER
Pt called the office complaining of a lump at her incision site.  It looks like a pimple.  She states when she wears her binder it makes it hurt.

## 2024-10-15 ENCOUNTER — OFFICE VISIT (OUTPATIENT)
Dept: SURGERY | Age: 59
End: 2024-10-15
Payer: COMMERCIAL

## 2024-10-15 VITALS
SYSTOLIC BLOOD PRESSURE: 142 MMHG | HEIGHT: 68 IN | HEART RATE: 79 BPM | OXYGEN SATURATION: 97 % | BODY MASS INDEX: 28.64 KG/M2 | WEIGHT: 189 LBS | DIASTOLIC BLOOD PRESSURE: 80 MMHG | TEMPERATURE: 98.3 F

## 2024-10-15 DIAGNOSIS — Z98.890 S/P EXCISIONAL DEBRIDEMENT: Primary | ICD-10-CM

## 2024-10-15 DIAGNOSIS — Z09 STATUS POST UMBILICAL HERNIA REPAIR, FOLLOW-UP EXAM: ICD-10-CM

## 2024-10-15 PROCEDURE — 99212 OFFICE O/P EST SF 10 MIN: CPT | Performed by: NURSE PRACTITIONER

## 2024-10-15 NOTE — PROGRESS NOTES
MG-UNIT TABS Take  by mouth 2 times daily.       No current facility-administered medications for this visit.     Allergies   Allergen Reactions   • Augmentin [Amoxicillin-Pot Clavulanate] Hives, Diarrhea and Swelling   • Hydantoins    • Other      Sodium pentathol  Makes me a \"crazy person\"  Steri strips caused redness and rash  bandaids - rash   • Pcn [Penicillins]    • Biaxin [Clarithromycin] Rash   • Norco [Hydrocodone-Acetaminophen] Other (See Comments)     Diaphoretic, flushed   • Vancomycin Itching and Rash     Sumanth syndrome       Subjective:     Review of Systems    Objective:   BP (!) 142/80 (Site: Right Upper Arm, Position: Sitting, Cuff Size: Large Adult)   Pulse 79   Temp 98.3 °F (36.8 °C) (Temporal)   Ht 1.727 m (5' 7.99\")   Wt 85.7 kg (189 lb)   LMP 06/12/2016   SpO2 97%   BMI 28.74 kg/m²   Wt Readings from Last 3 Encounters:   10/15/24 85.7 kg (189 lb)   10/08/24 84.9 kg (187 lb 3.2 oz)   09/11/24 85 kg (187 lb 6.4 oz)         Physical Exam    Lab Results   Component Value Date    WBC 6.8 05/01/2024    HGB 12.8 09/19/2024    HCT 39.4 09/19/2024    MCV 86.3 05/01/2024     05/01/2024     Lab Results   Component Value Date/Time     09/19/2024 03:17 PM    K 4.0 09/19/2024 03:17 PM    CL 99 09/19/2024 03:17 PM    CO2 27 09/19/2024 03:17 PM    BUN 17 09/19/2024 03:17 PM    CREATININE 0.7 09/19/2024 03:17 PM    GLUCOSE 94 09/19/2024 03:17 PM    CALCIUM 9.4 09/19/2024 03:17 PM      Patient Active Problem List   Diagnosis   • Breast cancer (HCC)   • Incarcerated incisional hernia   • S/P laparoscopic hysterectomy   • Open wound of umbilical region   • Ruptured left breast implant, initial encounter   • History of reconstruction of both breasts   • Recurrent umbilical hernia       Assessment:     Status post ***    Plan:     Abdomen benign. Incisions are healing well without signs of infection. Continue wound care as directed.  No bulge or instability noted at old hernia site  Appetite 
tenderness.   Abdominal:      General: Bowel sounds are normal.      Palpations: Abdomen is soft.       Musculoskeletal:         General: No tenderness. Normal range of motion.      Cervical back: Normal range of motion and neck supple.   Skin:     General: Skin is warm and dry.   Neurological:      Mental Status: She is alert and oriented to person, place, and time.   Psychiatric:         Speech: Speech normal.         Behavior: Behavior normal.         Thought Content: Thought content normal.             Patient Active Problem List   Diagnosis    Breast cancer (HCC)    Incarcerated incisional hernia    S/P laparoscopic hysterectomy    Open wound of umbilical region    Ruptured left breast implant, initial encounter    History of reconstruction of both breasts    Recurrent umbilical hernia       Assessment:     Status post excisional debridement of skin, subcutaneous tissue, and muscle/fascia for chronic nonhealing and draining abdominal wall wound along with primary repair of recurrent incarcerated umbilical hernia 10/15/24    Plan:     Incisions healing well without signs of infection. Continue wound care as directed.  Surgical glue removed.  Area cleansed with saline.  Steri-Strips then applied.  Encourage patient to leave area open to air.  No bulge or instability noted at old hernia site  Wear abdominal binder for comfort as needed  Off narcotics. Tylenol and/or Motrin as needed for discomfort.  Lifting/activity restrictions discussed with patient. Questions answered.  Restrictions for the full 6 weeks postop.  Follow up in 1 week as already previously scheduled. Signs and symptoms reviewed with patient that would be concerning and need her to return to office for re-evaluation.  Patient states she will call if she has questions or concerns.      Total time spent in care of patient:  15 minutes collectively between subjective/objective examination, chart review, documentation, clinical reasoning and discussion

## 2024-10-18 ASSESSMENT — ENCOUNTER SYMPTOMS
CHEST TIGHTNESS: 0
ABDOMINAL DISTENTION: 0
BLOOD IN STOOL: 0
ANAL BLEEDING: 0
ALLERGIC/IMMUNOLOGIC NEGATIVE: 1
CONSTIPATION: 0
WHEEZING: 0
ABDOMINAL PAIN: 1
EYE ITCHING: 0
VOMITING: 0
BACK PAIN: 0
NAUSEA: 0
EYE DISCHARGE: 0
COLOR CHANGE: 0
DIARRHEA: 0
SINUS PRESSURE: 0
CHOKING: 0
RHINORRHEA: 0
SORE THROAT: 0
APNEA: 0
EYE PAIN: 0
COUGH: 0
SHORTNESS OF BREATH: 0
PHOTOPHOBIA: 0

## 2024-10-23 ENCOUNTER — OFFICE VISIT (OUTPATIENT)
Dept: SURGERY | Age: 59
End: 2024-10-23
Payer: COMMERCIAL

## 2024-10-23 VITALS
OXYGEN SATURATION: 96 % | DIASTOLIC BLOOD PRESSURE: 80 MMHG | HEIGHT: 68 IN | TEMPERATURE: 98.1 F | HEART RATE: 85 BPM | BODY MASS INDEX: 28.46 KG/M2 | WEIGHT: 187.8 LBS | SYSTOLIC BLOOD PRESSURE: 134 MMHG

## 2024-10-23 DIAGNOSIS — Z98.890 S/P EXCISIONAL DEBRIDEMENT: ICD-10-CM

## 2024-10-23 DIAGNOSIS — Z09 STATUS POST UMBILICAL HERNIA REPAIR, FOLLOW-UP EXAM: Primary | ICD-10-CM

## 2024-10-23 PROCEDURE — 99212 OFFICE O/P EST SF 10 MIN: CPT

## 2024-10-24 ASSESSMENT — ENCOUNTER SYMPTOMS
COUGH: 0
VOMITING: 0
DIARRHEA: 0
ABDOMINAL PAIN: 1
CONSTIPATION: 0
NAUSEA: 0
BLOOD IN STOOL: 0
COLOR CHANGE: 1
SHORTNESS OF BREATH: 0

## 2024-10-24 NOTE — PROGRESS NOTES
[facility] [place] [facility]  [facility]  [address]  [facility] [address]  Dept: [phone]  Dept Fax: [phone]  Loc: [phone]    Visit Date: 10/23/2024    Monse Castro is a 58 y.o. female who presents today for:  Chief Complaint   Patient presents with    Follow-up     S/p Excisional debridement of skin, subcutaneous tissue, and muscle/fascia, chronic nonhealing and draining abdominal wall wound (2 x 4 cm), primary repair of recurrent incarcerated umbilical hernia (3.5 cm defect) 10/08/24. Last office visit 10/15/24       HPI:     HPI    Monse is a 58-year-old female patient who presents today for wound check. She is status post excisional debridement of skin, subcutaneous tissue, and muscle/fascia for chronic nonhealing and draining abdominal wall wound along with primary repair of recurrent incarcerated umbilical hernia 2 weeks ago with Dr. Jackson.  1 week postop the patient noticed today lump and redness on her incision.  She was seen by the nurse practitioner at the office.  Surgical glue was removed and Steri-Strips were applied.  Patient's incision has improved.  Her redness is gone down significantly.  She believes this is probably due to a skin glue causing irritation.  Her incision site is doing much better but she has noticed a coming apart slightly.  She denies any significant drainage.  Steri-Strips were removed, wound was evaluated, and Steri-Strips were reapplied to the slightly dehisced region of the incision site.  See image for wound details.  No longer requiring narcotics.  Some swelling remains.  Over-the-counter medications as needed.  Eating and drinking well.  Bowel function back to normal.  No fevers or chills.  Patient will remain off work for the full 6 weeks.  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  No new concerns at this time.      Past Medical History:   Diagnosis Date    Breast cancer (HCC) 2015

## 2024-11-01 DIAGNOSIS — K42.9 RECURRENT UMBILICAL HERNIA: ICD-10-CM

## 2024-11-06 ENCOUNTER — OFFICE VISIT (OUTPATIENT)
Dept: SURGERY | Age: 59
End: 2024-11-06

## 2024-11-06 VITALS
HEART RATE: 89 BPM | TEMPERATURE: 97.4 F | BODY MASS INDEX: 28.56 KG/M2 | HEIGHT: 68 IN | OXYGEN SATURATION: 98 % | DIASTOLIC BLOOD PRESSURE: 84 MMHG | SYSTOLIC BLOOD PRESSURE: 130 MMHG

## 2024-11-06 DIAGNOSIS — Z09 STATUS POST UMBILICAL HERNIA REPAIR, FOLLOW-UP EXAM: Primary | ICD-10-CM

## 2024-11-06 DIAGNOSIS — Z51.89 VISIT FOR WOUND CHECK: ICD-10-CM

## 2024-11-06 PROCEDURE — 99024 POSTOP FOLLOW-UP VISIT: CPT

## 2024-11-07 ASSESSMENT — ENCOUNTER SYMPTOMS
VOMITING: 0
COUGH: 0
ABDOMINAL PAIN: 1
NAUSEA: 0
DIARRHEA: 0
SHORTNESS OF BREATH: 0
COLOR CHANGE: 0
BLOOD IN STOOL: 0
CONSTIPATION: 0

## 2024-11-07 NOTE — PROGRESS NOTES
Information      Document Information    Wound Care Image: Wound      10/23/2024 13:49   Attached To:   Office Visit on 10/23/24 with Yoan Ba PA-C   Source Information    Yoan Ba PA-C  [facility]   Document History        Patient Active Problem List   Diagnosis    Breast cancer (HCC)    Incarcerated incisional hernia    S/P laparoscopic hysterectomy    Open wound of umbilical region    Ruptured left breast implant, initial encounter    History of reconstruction of both breasts    Recurrent umbilical hernia       Assessment:     Status post excisional debridement of skin, subcutaneous tissue, and muscle/fascia for chronic nonhealing and draining abdominal wall wound along with primary repair of recurrent incarcerated umbilical hernia 10/15/24   Incisional wound -improving    Plan:     Incision without signs of infection.  Mild abdominal swelling above the incision site and to the left.  Steri-Strips applied to umbilical incision site with wound approximated. Continue wound care as directed.   No bulge or instability noted at old hernia site  Wear abdominal binder for comfort as needed  Off narcotics. Tylenol and/or Motrin as needed for discomfort.  Lifting/activity restrictions discussed with patient. Questions answered.  Restrictions for the full 6 weeks postop.   Work restriction forms were filled out and sent to work.  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  She would tentatively like to return to work on November 21.  Follow up in 2 weeks for reevaluation of umbilical skin lesions. Signs and symptoms reviewed with patient that would be concerning and need her to return to office for re-evaluation.  Patient states she will call if she has questions or concerns.    Total time spent in care of patient:  15 minutes collectively between subjective/objective examination, chart review, documentation, clinical reasoning and discussion with attending regarding plan/interval changes.

## 2024-11-12 LAB
FUNGUS SPEC CULT: NORMAL
FUNGUS SPEC FUNGUS STN: NORMAL

## 2024-11-20 ENCOUNTER — OFFICE VISIT (OUTPATIENT)
Dept: SURGERY | Age: 59
End: 2024-11-20

## 2024-11-20 VITALS
HEART RATE: 100 BPM | TEMPERATURE: 97.4 F | OXYGEN SATURATION: 91 % | SYSTOLIC BLOOD PRESSURE: 138 MMHG | DIASTOLIC BLOOD PRESSURE: 84 MMHG | RESPIRATION RATE: 18 BRPM

## 2024-11-20 DIAGNOSIS — Z51.89 VISIT FOR WOUND CHECK: ICD-10-CM

## 2024-11-20 DIAGNOSIS — Z09 STATUS POST UMBILICAL HERNIA REPAIR, FOLLOW-UP EXAM: Primary | ICD-10-CM

## 2024-11-23 ASSESSMENT — ENCOUNTER SYMPTOMS
VOMITING: 0
ABDOMINAL PAIN: 0
NAUSEA: 0
BLOOD IN STOOL: 0
COLOR CHANGE: 0
COUGH: 0
CONSTIPATION: 0
DIARRHEA: 0
SHORTNESS OF BREATH: 0

## 2024-11-23 NOTE — PROGRESS NOTES
[facility] [place] [facility]  [facility]  [address]  [facility] [address]  Dept: [phone]  Dept Fax: [phone]  Loc: [phone]    Visit Date: 11/20/2024    Monse Castro is a 58 y.o. female who presents today for:  Chief Complaint   Patient presents with    Follow-up     S/p Excisional debridement of skin, subcutaneous tissue, and muscle/fascia, chronic nonhealing and draining abdominal wall wound (2 x 4 cm), primary repair of recurrent incarcerated umbilical hernia (3.5 cm defect) 10/08/24. Last office visit 11/6/24 - Wound check       HPI:     JENNY Shea is a 58-year-old female patient who presents today for wound check. She is status post excisional debridement of skin, subcutaneous tissue, and muscle/fascia for chronic nonhealing and draining abdominal wall wound along with primary repair of recurrent incarcerated umbilical hernia approximately 6 weeks ago with Dr. Jackson.  1 week postop the patient noticed a lump and redness on her incision.  She was seen by the nurse practitioner at the office.  Surgical glue was removed and Steri-Strips were applied. The redness had improved initially but her incision site had come apart slightly.  At the last visit Steri-Strips were removed and some yellow drainage was noted.  Dr. Jackson examined the patient agreed to continue Steri-Strips for another week.  At today's visit Steri-Strips were removed.  Patient denies any drainage and no drainage was noted on exam.  No open wound.  No tracking, erythema, or drainage.  Patient pleased with wound healing.  Nurse practitioner also assessed patient and agreed that wound was healed perfectly fine.  She also has some abdominal wall swelling to the right and left of her bellybutton.  It is fluctuant in nature.  She says it does not bother her.  Patient plans to go back to work on Monday.  Work note provided.  She wears her abdominal binder as needed

## 2024-12-12 ENCOUNTER — HOSPITAL ENCOUNTER (OUTPATIENT)
Age: 59
Discharge: HOME | End: 2024-12-12
Payer: COMMERCIAL

## 2024-12-12 LAB
BACTERIA URNS QL MICRO: ABNORMAL /HPF
BILIRUB UR QL STRIP.AUTO: NEGATIVE
CASTS #/AREA URNS LPF: ABNORMAL /LPF
CASTS 2: ABNORMAL /LPF
CHARACTER UR: CLEAR
COLOR, UA: YELLOW
CRYSTALS URNS MICRO: ABNORMAL
EPITHELIAL CELLS, UA: ABNORMAL /HPF
GLUCOSE UR QL STRIP.AUTO: NEGATIVE MG/DL
HGB UR QL STRIP.AUTO: NEGATIVE
KETONES UR QL STRIP.AUTO: NEGATIVE
MISCELLANEOUS 2: ABNORMAL
NITRITE UR QL STRIP: NEGATIVE
PH UR STRIP.AUTO: 6.5 [PH] (ref 5–9)
PROT UR STRIP.AUTO-MCNC: NEGATIVE MG/DL
RBC URINE: ABNORMAL /HPF
RENAL EPI CELLS #/AREA URNS HPF: ABNORMAL /[HPF]
SP GR UR REFRACT.AUTO: 1.02 (ref 1–1.03)
UROBILINOGEN, URINE: 0.2 EU/DL (ref 0–1)
WBC #/AREA URNS HPF: ABNORMAL /HPF
WBC #/AREA URNS HPF: ABNORMAL /[HPF]
YEAST LIKE FUNGI URNS QL MICRO: ABNORMAL

## 2024-12-12 PROCEDURE — 81001 URINALYSIS AUTO W/SCOPE: CPT

## 2024-12-12 PROCEDURE — 87086 URINE CULTURE/COLONY COUNT: CPT

## 2024-12-12 PROCEDURE — 87186 SC STD MICRODIL/AGAR DIL: CPT

## 2024-12-12 PROCEDURE — 87077 CULTURE AEROBIC IDENTIFY: CPT

## 2024-12-14 LAB
BACTERIA UR CULT: ABNORMAL
ORGANISM: ABNORMAL

## 2024-12-29 ENCOUNTER — HOSPITAL ENCOUNTER (EMERGENCY)
Age: 59
Discharge: HOME | End: 2024-12-29
Payer: COMMERCIAL

## 2024-12-29 VITALS
SYSTOLIC BLOOD PRESSURE: 169 MMHG | TEMPERATURE: 97.5 F | HEART RATE: 70 BPM | BODY MASS INDEX: 27.28 KG/M2 | RESPIRATION RATE: 16 BRPM | HEIGHT: 68 IN | OXYGEN SATURATION: 97 % | WEIGHT: 180 LBS | DIASTOLIC BLOOD PRESSURE: 93 MMHG

## 2024-12-29 DIAGNOSIS — H65.91 RIGHT NON-SUPPURATIVE OTITIS MEDIA: Primary | ICD-10-CM

## 2024-12-29 DIAGNOSIS — J01.00 ACUTE NON-RECURRENT MAXILLARY SINUSITIS: ICD-10-CM

## 2024-12-29 PROCEDURE — 99213 OFFICE O/P EST LOW 20 MIN: CPT | Performed by: PEDIATRICS

## 2024-12-29 PROCEDURE — 99213 OFFICE O/P EST LOW 20 MIN: CPT

## 2024-12-29 RX ORDER — DOXYCYCLINE HYCLATE 100 MG
100 TABLET ORAL 2 TIMES DAILY
Qty: 20 TABLET | Refills: 0 | Status: SHIPPED | OUTPATIENT
Start: 2024-12-29 | End: 2025-01-08

## 2024-12-29 ASSESSMENT — PAIN DESCRIPTION - PAIN TYPE: TYPE: ACUTE PAIN

## 2024-12-29 ASSESSMENT — PAIN DESCRIPTION - LOCATION: LOCATION: GENERALIZED

## 2024-12-29 ASSESSMENT — PAIN DESCRIPTION - ONSET: ONSET: ON-GOING

## 2024-12-29 ASSESSMENT — PAIN DESCRIPTION - DESCRIPTORS: DESCRIPTORS: ACHING

## 2024-12-29 ASSESSMENT — PAIN SCALES - GENERAL: PAINLEVEL_OUTOF10: 7

## 2024-12-29 ASSESSMENT — PAIN - FUNCTIONAL ASSESSMENT
PAIN_FUNCTIONAL_ASSESSMENT: 0-10
PAIN_FUNCTIONAL_ASSESSMENT: ACTIVITIES ARE NOT PREVENTED

## 2024-12-29 ASSESSMENT — PAIN DESCRIPTION - FREQUENCY: FREQUENCY: CONTINUOUS

## 2024-12-29 NOTE — ED TRIAGE NOTES
Pt to urgent care due to laryngitis and ear pain. New onset of symptoms stated 12/23 and she reports just not getting better.She states her throat feels swollen and now she is unable to speak.

## 2024-12-29 NOTE — DISCHARGE INSTRUCTIONS
Drink lots of fluids  Take Motrin or Tylenol for headache  Humidification of air  Use nasal spray as directed  Take a nasal decongestant as directed  Monitor for any fever increased and sinus pain or pressure  Follow-up see her primary care provider in 48 hours  Or go to the emergency department for any changes or concerns.Patient has experienced symptoms related to viral pharyngitis for less than a week, including sore throat, trouble swallowing, hoarseness to voice without complication of upper respiratory illness.  Patient has oropharyngeal edema and erythema without exudate or tonsillar involvement.  Patient does not match [facility] criteria: no signs of fever, cough, exudate on tonsils, or anterior adenopathy for strep testing.  Patient was given education on increasing fluids, salt water gargles, use of honey, and resting voice for symptomatic care. Patient states understanding of home care.  Patient is agreeable to the treatment plan and will follow up with her primary care provider within the next week or return here or go to the emergency department for any changes or concerns.  The patient left without any assistance

## 2024-12-29 NOTE — ED PROVIDER NOTES
[facility] URGENT CARE  Urgent Care Encounter       CHIEF COMPLAINT       Chief Complaint   Patient presents with    Laryngitis    Ear Pain       Nurses Notes reviewed and I agree except as noted in the HPI.  HISTORY OF PRESENT ILLNESS   Monse Castro is a 59 y.o. female who presents with 1 week history of sore throat and left ear pain.  Patient reports taking Advil Cold and Sinus, states this has been helping some but her symptoms have persisted for over 1 week.  Patient reports waking up 2 days ago and she has not lost her voice.  Patient reports using warm salt water gargles, states this did not help with her sore throat.  Patient reports multiple ill contacts over the holiday season.    The history is provided by the patient. No  was used.       REVIEW OF SYSTEMS     Review of Systems   Constitutional: Negative.    HENT:  Positive for congestion, ear pain (right ear pain), sinus pressure, sinus pain, sore throat and voice change.    Eyes: Negative.    Respiratory: Negative.     Cardiovascular: Negative.    Gastrointestinal: Negative.    Endocrine: Negative.    Genitourinary: Negative.    Musculoskeletal: Negative.    Skin: Negative.    Allergic/Immunologic: Negative.    Neurological: Negative.    Hematological: Negative.    Psychiatric/Behavioral: Negative.         PAST MEDICAL HISTORY         Diagnosis Date    Breast cancer ([facility]) 2015    age 49    Cancer (HCC) 2015, jan.    breast, left    COVID 01/2024    Hypertension     Hypothyroidism     Kidney stone     PONV (postoperative nausea and vomiting)     Thyroid disease        SURGICALHISTORY     Patient  has a past surgical history that includes Tonsillectomy; Cholecystectomy (1990); Colonoscopy; Mastectomy (Bilateral, 2/11/15); Foot surgery (1983); Dilation and curettage of uterus (1994); Cosmetic surgery (10/2015); Breast reconstruction (6/30/15); Breast surgery (02/2015); Umbilical hernia repair (N/A, 4/8/2021);

## 2025-03-12 ENCOUNTER — OFFICE VISIT (OUTPATIENT)
Dept: SURGERY | Age: 60
End: 2025-03-12
Payer: COMMERCIAL

## 2025-03-12 VITALS
SYSTOLIC BLOOD PRESSURE: 122 MMHG | DIASTOLIC BLOOD PRESSURE: 80 MMHG | OXYGEN SATURATION: 96 % | BODY MASS INDEX: 28.64 KG/M2 | HEIGHT: 68 IN | HEART RATE: 78 BPM | TEMPERATURE: 97.5 F | WEIGHT: 189 LBS

## 2025-03-12 DIAGNOSIS — K42.9 RECURRENT UMBILICAL HERNIA: Primary | ICD-10-CM

## 2025-03-12 PROCEDURE — 99214 OFFICE O/P EST MOD 30 MIN: CPT | Performed by: NURSE PRACTITIONER

## 2025-03-12 RX ORDER — LISINOPRIL AND HYDROCHLOROTHIAZIDE 12.5; 2 MG/1; MG/1
1 TABLET ORAL DAILY
COMMUNITY
Start: 2025-02-03

## 2025-03-12 ASSESSMENT — ENCOUNTER SYMPTOMS
SINUS PRESSURE: 0
WHEEZING: 0
COLOR CHANGE: 0
CHEST TIGHTNESS: 0
APNEA: 0
COUGH: 0
ABDOMINAL PAIN: 0
RHINORRHEA: 0
ALLERGIC/IMMUNOLOGIC NEGATIVE: 1
EYE PAIN: 0
CONSTIPATION: 0
ABDOMINAL DISTENTION: 0
PHOTOPHOBIA: 0
BACK PAIN: 0
ANAL BLEEDING: 0
NAUSEA: 0
DIARRHEA: 0
EYE DISCHARGE: 0
CHOKING: 0
VOMITING: 0
EYE ITCHING: 0
BLOOD IN STOOL: 0
SHORTNESS OF BREATH: 0
SORE THROAT: 0

## 2025-03-12 NOTE — PROGRESS NOTES
patient including non-operative management.  The pros and cons of mesh insertion were discussed.  All questions answered. She understands and wishes to proceed with surgical intervention.  4. Restrictions discussed with Monse and she expresses understanding.  5. She is advised to call back directly if there are further questions/concerns, or if her symptoms worsen prior to surgery.      Total time spent in care of patient:  30 minutes collectively between subjective/objective examination, chart review, documentation, clinical reasoning and discussion with attending regarding plan/interval changes.     Electronically signed by ABRAN REICH CNP on 3/12/2025 at 5:13 PM

## 2025-04-29 ENCOUNTER — LAB (OUTPATIENT)
Dept: LAB | Age: 60
End: 2025-04-29

## 2025-04-29 LAB
ALBUMIN SERPL BCG-MCNC: 4.7 G/DL (ref 3.4–4.9)
ALP SERPL-CCNC: 102 U/L (ref 38–126)
ALT SERPL W/O P-5'-P-CCNC: 70 U/L (ref 10–35)
ANION GAP SERPL CALC-SCNC: 11 MEQ/L (ref 8–16)
AST SERPL-CCNC: 41 U/L (ref 10–35)
BASOPHILS ABSOLUTE: 0.1 THOU/MM3 (ref 0–0.1)
BASOPHILS NFR BLD AUTO: 1 %
BILIRUB SERPL-MCNC: 0.7 MG/DL (ref 0.3–1.2)
BUN SERPL-MCNC: 19 MG/DL (ref 8–23)
CALCIUM SERPL-MCNC: 10.2 MG/DL (ref 8.8–10.2)
CHLORIDE SERPL-SCNC: 100 MEQ/L (ref 98–111)
CO2 SERPL-SCNC: 27 MEQ/L (ref 22–29)
CREAT SERPL-MCNC: 0.8 MG/DL (ref 0.5–0.9)
DEPRECATED RDW RBC AUTO: 41.1 FL (ref 35–45)
EOSINOPHIL NFR BLD AUTO: 3.1 %
EOSINOPHILS ABSOLUTE: 0.2 THOU/MM3 (ref 0–0.4)
ERYTHROCYTE [DISTWIDTH] IN BLOOD BY AUTOMATED COUNT: 13 % (ref 11.5–14.5)
GFR SERPL CREATININE-BSD FRML MDRD: 85 ML/MIN/1.73M2
GLUCOSE SERPL-MCNC: 98 MG/DL (ref 74–109)
HCT VFR BLD AUTO: 42 % (ref 37–47)
HGB BLD-MCNC: 13.8 GM/DL (ref 12–16)
IMM GRANULOCYTES # BLD AUTO: 0.02 THOU/MM3 (ref 0–0.07)
IMM GRANULOCYTES NFR BLD AUTO: 0.4 %
LYMPHOCYTES ABSOLUTE: 1.6 THOU/MM3 (ref 1–4.8)
LYMPHOCYTES NFR BLD AUTO: 30.5 %
MCH RBC QN AUTO: 28.5 PG (ref 26–33)
MCHC RBC AUTO-ENTMCNC: 32.9 GM/DL (ref 32.2–35.5)
MCV RBC AUTO: 86.8 FL (ref 81–99)
MONOCYTES ABSOLUTE: 0.5 THOU/MM3 (ref 0.4–1.3)
MONOCYTES NFR BLD AUTO: 9.9 %
NEUTROPHILS ABSOLUTE: 2.9 THOU/MM3 (ref 1.8–7.7)
NEUTROPHILS NFR BLD AUTO: 55.1 %
NRBC BLD AUTO-RTO: 0 /100 WBC
PLATELET # BLD AUTO: 274 THOU/MM3 (ref 130–400)
PMV BLD AUTO: 10.3 FL (ref 9.4–12.4)
POTASSIUM SERPL-SCNC: 4.3 MEQ/L (ref 3.5–5.2)
PROT SERPL-MCNC: 7.6 G/DL (ref 6.4–8.3)
RBC # BLD AUTO: 4.84 MILL/MM3 (ref 4.2–5.4)
SODIUM SERPL-SCNC: 138 MEQ/L (ref 135–145)
T4 FREE SERPL-MCNC: 1.6 NG/DL (ref 0.92–1.68)
TSH SERPL DL<=0.05 MIU/L-ACNC: 1.25 UIU/ML (ref 0.27–4.2)
WBC # BLD AUTO: 5.2 THOU/MM3 (ref 4.8–10.8)

## 2025-05-02 LAB — CANCER AG27-29 SERPL-ACNC: 20 U/ML (ref 0–38)

## 2025-05-20 ENCOUNTER — LAB (OUTPATIENT)
Dept: LAB | Age: 60
End: 2025-05-20

## 2025-05-20 LAB
ALBUMIN SERPL BCG-MCNC: 4.7 G/DL (ref 3.4–4.9)
ALP SERPL-CCNC: 107 U/L (ref 38–126)
ALT SERPL W/O P-5'-P-CCNC: 88 U/L (ref 10–35)
ANION GAP SERPL CALC-SCNC: 14 MEQ/L (ref 8–16)
APTT PPP: 28 SECONDS (ref 22–38)
AST SERPL-CCNC: 43 U/L (ref 10–35)
BASOPHILS ABSOLUTE: 0 THOU/MM3 (ref 0–0.1)
BASOPHILS NFR BLD AUTO: 0.8 %
BILIRUB SERPL-MCNC: 0.6 MG/DL (ref 0.3–1.2)
BUN SERPL-MCNC: 16 MG/DL (ref 8–23)
CALCIUM SERPL-MCNC: 9.9 MG/DL (ref 8.8–10.2)
CHLORIDE SERPL-SCNC: 97 MEQ/L (ref 98–111)
CO2 SERPL-SCNC: 28 MEQ/L (ref 22–29)
CREAT SERPL-MCNC: 1.1 MG/DL (ref 0.5–0.9)
DEPRECATED RDW RBC AUTO: 41.3 FL (ref 35–45)
EOSINOPHIL NFR BLD AUTO: 2.7 %
EOSINOPHILS ABSOLUTE: 0.2 THOU/MM3 (ref 0–0.4)
ERYTHROCYTE [DISTWIDTH] IN BLOOD BY AUTOMATED COUNT: 13.1 % (ref 11.5–14.5)
FERRITIN SERPL IA-MCNC: 310 NG/ML (ref 13–150)
FIBRINOGEN PPP-MCNC: 330 MG/100ML (ref 155–475)
FOLATE SERPL-MCNC: 14.6 NG/ML (ref 4.6–34.8)
GFR SERPL CREATININE-BSD FRML MDRD: 58 ML/MIN/1.73M2
GLUCOSE SERPL-MCNC: 110 MG/DL (ref 74–109)
HCT VFR BLD AUTO: 43.5 % (ref 37–47)
HGB BLD-MCNC: 14.3 GM/DL (ref 12–16)
IMM GRANULOCYTES # BLD AUTO: 0.02 THOU/MM3 (ref 0–0.07)
IMM GRANULOCYTES NFR BLD AUTO: 0.3 %
INR PPP: 0.9 (ref 0.85–1.13)
IRON SATN MFR SERPL: 18 % (ref 20–50)
IRON SERPL-MCNC: 52 UG/DL (ref 37–145)
LYMPHOCYTES ABSOLUTE: 1.8 THOU/MM3 (ref 1–4.8)
LYMPHOCYTES NFR BLD AUTO: 28.9 %
MCH RBC QN AUTO: 28.7 PG (ref 26–33)
MCHC RBC AUTO-ENTMCNC: 32.9 GM/DL (ref 32.2–35.5)
MCV RBC AUTO: 87.2 FL (ref 81–99)
MONOCYTES ABSOLUTE: 0.5 THOU/MM3 (ref 0.4–1.3)
MONOCYTES NFR BLD AUTO: 7.8 %
NEUTROPHILS ABSOLUTE: 3.7 THOU/MM3 (ref 1.8–7.7)
NEUTROPHILS NFR BLD AUTO: 59.5 %
NRBC BLD AUTO-RTO: 0 /100 WBC
PLATELET # BLD AUTO: 302 THOU/MM3 (ref 130–400)
PMV BLD AUTO: 10.2 FL (ref 9.4–12.4)
POTASSIUM SERPL-SCNC: 4 MEQ/L (ref 3.5–5.2)
PROT SERPL-MCNC: 7.9 G/DL (ref 6.4–8.3)
RBC # BLD AUTO: 4.99 MILL/MM3 (ref 4.2–5.4)
SODIUM SERPL-SCNC: 139 MEQ/L (ref 135–145)
TIBC SERPL-MCNC: 285 UG/DL (ref 171–450)
VIT B12 SERPL-MCNC: 536 PG/ML (ref 232–1245)
WBC # BLD AUTO: 6.2 THOU/MM3 (ref 4.8–10.8)

## 2025-05-21 LAB — TRANSFERRIN SERPL-MCNC: 250 MG/DL (ref 200–360)

## 2025-08-13 ENCOUNTER — TELEPHONE (OUTPATIENT)
Dept: SURGERY | Age: 60
End: 2025-08-13

## 2025-08-13 DIAGNOSIS — K42.9 RECURRENT UMBILICAL HERNIA: Primary | ICD-10-CM

## 2025-08-13 DIAGNOSIS — Z01.818 PRE-OP TESTING: ICD-10-CM

## 2025-08-13 DIAGNOSIS — I10 ESSENTIAL HYPERTENSION: ICD-10-CM

## (undated) DEVICE — SYRINGE MED 10ML LUERLOCK TIP W/O SFTY DISP

## (undated) DEVICE — GOWN,SIRUS,NON REINFRCD,LARGE,SET IN SL: Brand: MEDLINE

## (undated) DEVICE — SUTURE VCRL SZ 3-0 L27IN ABSRB UD L26MM SH 1/2 CIR J416H

## (undated) DEVICE — SURE SET SINGLE BASIN-LF: Brand: MEDLINE INDUSTRIES, INC.

## (undated) DEVICE — Z INACTIVE SYRINGE BLB IRR

## (undated) DEVICE — SUTURE PDS + SZ 0 L27IN ABSRB VLT L36MM CT 1 1 2 CIR PDP340H

## (undated) DEVICE — SHEET, T, LAPAROTOMY, STERILE: Brand: MEDLINE

## (undated) DEVICE — SUTURE MCRYL SZ 4-0 L27IN ABSRB UD L19MM PS-2 1/2 CIR PRIM Y426H

## (undated) DEVICE — GAUZE,SPONGE,4"X4",16PLY,STRL,LF,10/TRAY: Brand: MEDLINE

## (undated) DEVICE — PADDING CAST W6INXL4YD COT LO LINTING WYTEX

## (undated) DEVICE — PACK PROCEDURE SURG PLAS SC MIN SRHP LF

## (undated) DEVICE — HYPODERMIC SAFETY NEEDLE: Brand: MAGELLAN

## (undated) DEVICE — STRAP,POSITIONING,KNEE/BODY,FOAM,4X60": Brand: MEDLINE

## (undated) DEVICE — ELECTRO LUBE IS A SINGLE PATIENT USE DEVICE THAT IS INTENDED TO BE USED ON ELECTROSURGICAL ELECTRODES TO REDUCE STICKING.: Brand: KEY SURGICAL ELECTRO LUBE

## (undated) DEVICE — ELECTROSURGICAL PENCIL BUTTON SWITCH E-Z CLEAN COATED BLADE ELECTRODE 10 FT (3 M) CORD HOLSTER: Brand: MEGADYNE

## (undated) DEVICE — TRI-LUMEN FILTERED TUBE SET WITH ACTIVATED CHARCOAL FILTER: Brand: AIRSEAL

## (undated) DEVICE — SYRINGE MED 50ML LUERLOCK TIP

## (undated) DEVICE — ELECTRODE PT RET AD L9FT HI MOIST COND ADH HYDRGEL CORDED

## (undated) DEVICE — ARM DRAPE

## (undated) DEVICE — SURGICEL ENDOSCP APPL

## (undated) DEVICE — SOLUTION IV 100ML 0.9% SOD CHL PLAS CONT USP VIAFLX 1 PER

## (undated) DEVICE — DRAPE,REIN 53X77,STERILE: Brand: MEDLINE

## (undated) DEVICE — COUNTER NDL 40 COUNT HLD 70 FOAM BLK ADH W/ MAG

## (undated) DEVICE — SUTURE VCRL SZ 4-0 L27IN ABSRB UD L19MM FS-2 3/8 CIR REV J422H

## (undated) DEVICE — AGENT HEMSTAT 3GM OXIDIZED REGENERATED CELOS ABSRB FOR CONT (ORDER MULTIPLES OF 5EA)

## (undated) DEVICE — SUTURE V-LOC 180 SZ 2-0 L12IN ABSRB VLT GS-21 L37MM 1/2 CIR VLOCM0315

## (undated) DEVICE — INSUFFLATION NEEDLE TO ESTABLISH PNEUMOPERITONEUM.: Brand: INSUFFLATION NEEDLE

## (undated) DEVICE — GAUZE,SPONGE,8"X4",12PLY,XRAY,STRL,LF: Brand: MEDLINE

## (undated) DEVICE — OBTURATOR ROBOTIC DIA8MM BLDELSS ENDOSCP DISP DA VINCI SI

## (undated) DEVICE — AIRSEAL 8 MM ACCESS PORT AND LOW PROFILE OBTURATOR WITH BLADELESS OPTICAL TIP, 120 MM LENGTH: Brand: AIRSEAL

## (undated) DEVICE — VESSEL SEALER: Brand: ENDOWRIST;DAVINCI SI

## (undated) DEVICE — GLOVE ORANGE PI 8   MSG9080

## (undated) DEVICE — 450 ML BOTTLE OF 0.05% CHLORHEXIDINE GLUCONATE IN 99.95% STERILE WATER FOR IRRIGATION, USP AND APPLICATOR.: Brand: IRRISEPT ANTIMICROBIAL WOUND LAVAGE

## (undated) DEVICE — CHLORAPREP 26ML ORANGE

## (undated) DEVICE — BLADELESS OBTURATOR: Brand: WECK VISTA

## (undated) DEVICE — PACK PROCEDURE SURG GYN ROBOTIC

## (undated) DEVICE — INTENDED FOR TISSUE SEPARATION, AND OTHER PROCEDURES THAT REQUIRE A SHARP SURGICAL BLADE TO PUNCTURE OR CUT.: Brand: BARD-PARKER ® CARBON RIB-BACK BLADES

## (undated) DEVICE — SUTURE VCRL SZ 0 L18IN ABSRB VLT SUTUPAK PRECUT W/O NDL J106T

## (undated) DEVICE — TIP COVER ACCESSORY

## (undated) DEVICE — BANDAGE ADH W1XL3IN NAT FAB WVN FLX DURABLE N ADH PD SEAL

## (undated) DEVICE — STRIP,CLOSURE,WOUND,MEDI-STRIP,1/2X4: Brand: MEDLINE

## (undated) DEVICE — SOLUTION SCRB 4OZ 7.5% POVIDONE IOD ANTIMIC BTL

## (undated) DEVICE — SPONGE LAP W18XL18IN WHT COT 4 PLY FLD STRUNG RADPQ DISP ST 2 PER PACK

## (undated) DEVICE — SUTURE VCRL SZ 3-0 L27IN ABSRB UD L19MM PS-2 3/8 CIR PRIM J427H

## (undated) DEVICE — GLOVE SURG SZ 7 L12IN FNGR THK94MIL TRNSLUC YEL LTX HYDRGEL

## (undated) DEVICE — TUBING INSUFFLATOR HEAT HUMIDIFIED SMK EVAC SET PNEUMOCLEAR

## (undated) DEVICE — BINDER ABD UNISX 4 PNL PREM 6INX6INX12IN L XL 4

## (undated) DEVICE — KIT DRP 3 ARM ACC DISP ENDOWRIST DA VINCI SI

## (undated) DEVICE — GOWN,SIRUS,NONRNF,SETINSLV,XL,20/CS: Brand: MEDLINE

## (undated) DEVICE — TROCAR: Brand: KII FIOS FIRST ENTRY

## (undated) DEVICE — 3M™ STERI-STRIP™ REINFORCED ADHESIVE SKIN CLOSURES, R1547, 1/2 IN X 4 IN (12 MM X 100 MM), 6 STRIPS/ENVELOPE: Brand: 3M™ STERI-STRIP™

## (undated) DEVICE — MHPB GEN MINOR PACK: Brand: MEDLINE INDUSTRIES, INC.

## (undated) DEVICE — SKIN AFFIX SURG ADHESIVE 72/CS 0.55ML: Brand: MEDLINE

## (undated) DEVICE — REDUCER: Brand: ENDOWRIST

## (undated) DEVICE — SYRINGE, LUER LOCK, 10ML: Brand: MEDLINE

## (undated) DEVICE — SOLUTION IRRIG 1000ML 0.9% SOD CHL USP POUR PLAS BTL

## (undated) DEVICE — GLOVE ORANGE PI 7 1/2   MSG9075

## (undated) DEVICE — SPONGE GZ 4X4 IN 16-PLY DETECTABLE W/ DMT MSTR TAG

## (undated) DEVICE — BASIC SINGLE BASIN BTC-LF: Brand: MEDLINE INDUSTRIES, INC.

## (undated) DEVICE — MARKER,SKIN,WI/RULER AND LABELS: Brand: MEDLINE

## (undated) DEVICE — 1840 FOAM BLOCK NEEDLE COUNTER: Brand: DEVON

## (undated) DEVICE — SOLUTION IV 500ML 0.9% SOD CHL PH 5 INJ USP VIAFLX PLAS

## (undated) DEVICE — BRA COMPR 2XL NYL LYCRA SPANDEX WHT CURAD

## (undated) DEVICE — SUTURE V-LOC 180 SZ 2-0 L9IN ABSRB VLT GS-21 L37MM 1/2 CIR VLOCM0345

## (undated) DEVICE — ROUND HIGH PROFILE  SALINE BREAST IMPLANT SIZER, 500CC: Brand: MENTOR ROUND HIGH PROFILE SINGLE USE SALINE BREAST IMPLANT SIZER

## (undated) DEVICE — SEAL

## (undated) DEVICE — SUTURE VICRYL + SZ 0 L27IN ABSRB VLT L26MM UR-6 5/8 CIR VCP603H

## (undated) DEVICE — SPONGE GZ W4XL4IN 16 PLY DATA MSTR TAGGED DBL RADPQ

## (undated) DEVICE — GLOVE ORANGE PI 7   MSG9070

## (undated) DEVICE — GLOVE ORANGE PI 8 1/2   MSG9085

## (undated) DEVICE — SUTURE ETHBND D SPEC NO 0 UR 6 30IN D9436

## (undated) DEVICE — SYRINGE MED 30ML STD CLR PLAS LUERLOCK TIP N CTRL DISP

## (undated) DEVICE — LIQUIBAND RAPID ADHESIVE 36/CS 0.8ML: Brand: MEDLINE

## (undated) DEVICE — COLUMN DRAPE

## (undated) DEVICE — GENERAL LAPAROSCOPY-LF: Brand: MEDLINE INDUSTRIES, INC.

## (undated) DEVICE — DECANTER BAG 9": Brand: MEDLINE INDUSTRIES, INC.

## (undated) DEVICE — PREMIUM DRY TRAY LF: Brand: MEDLINE INDUSTRIES, INC.